# Patient Record
Sex: FEMALE | Race: WHITE
[De-identification: names, ages, dates, MRNs, and addresses within clinical notes are randomized per-mention and may not be internally consistent; named-entity substitution may affect disease eponyms.]

---

## 2017-08-03 ENCOUNTER — HOSPITAL ENCOUNTER (OUTPATIENT)
Dept: HOSPITAL 17 - PHSDC | Age: 82
Discharge: HOME | End: 2017-08-03
Attending: OPTOMETRIST
Payer: MEDICARE

## 2017-08-03 VITALS
TEMPERATURE: 97.8 F | OXYGEN SATURATION: 95 % | RESPIRATION RATE: 16 BRPM | SYSTOLIC BLOOD PRESSURE: 163 MMHG | DIASTOLIC BLOOD PRESSURE: 79 MMHG | HEART RATE: 90 BPM

## 2017-08-03 VITALS
HEART RATE: 68 BPM | RESPIRATION RATE: 20 BRPM | SYSTOLIC BLOOD PRESSURE: 190 MMHG | DIASTOLIC BLOOD PRESSURE: 84 MMHG | TEMPERATURE: 98.2 F | OXYGEN SATURATION: 98 %

## 2017-08-03 VITALS — HEART RATE: 66 BPM

## 2017-08-03 VITALS — HEIGHT: 61 IN | WEIGHT: 140.28 LBS | BODY MASS INDEX: 26.48 KG/M2

## 2017-08-03 VITALS — HEART RATE: 63 BPM

## 2017-08-03 DIAGNOSIS — E78.5: ICD-10-CM

## 2017-08-03 DIAGNOSIS — R49.0: ICD-10-CM

## 2017-08-03 DIAGNOSIS — L71.9: ICD-10-CM

## 2017-08-03 DIAGNOSIS — H34.8312: ICD-10-CM

## 2017-08-03 DIAGNOSIS — F43.23: ICD-10-CM

## 2017-08-03 DIAGNOSIS — Z96.1: ICD-10-CM

## 2017-08-03 DIAGNOSIS — Z79.82: ICD-10-CM

## 2017-08-03 DIAGNOSIS — E03.9: ICD-10-CM

## 2017-08-03 DIAGNOSIS — K21.9: ICD-10-CM

## 2017-08-03 DIAGNOSIS — Z79.899: ICD-10-CM

## 2017-08-03 DIAGNOSIS — N18.9: ICD-10-CM

## 2017-08-03 DIAGNOSIS — H04.123: ICD-10-CM

## 2017-08-03 DIAGNOSIS — G47.00: ICD-10-CM

## 2017-08-03 DIAGNOSIS — G51.0: ICD-10-CM

## 2017-08-03 DIAGNOSIS — I12.9: ICD-10-CM

## 2017-08-03 DIAGNOSIS — I70.0: ICD-10-CM

## 2017-08-03 DIAGNOSIS — H25.812: Primary | ICD-10-CM

## 2017-08-03 DIAGNOSIS — G25.0: ICD-10-CM

## 2017-08-03 DIAGNOSIS — M85.80: ICD-10-CM

## 2017-08-03 PROCEDURE — 00142 ANES PX ON EYE LENS SURGERY: CPT

## 2017-08-03 PROCEDURE — 66984 XCAPSL CTRC RMVL W/O ECP: CPT

## 2017-08-03 PROCEDURE — V2632 POST CHMBR INTRAOCULAR LENS: HCPCS

## 2017-08-03 RX ADMIN — FLURBIPROFEN SODIUM SCH DROP: 0.3 SOLUTION/ DROPS OPHTHALMIC at 06:55

## 2017-08-03 RX ADMIN — FLURBIPROFEN SODIUM SCH DROP: 0.3 SOLUTION/ DROPS OPHTHALMIC at 07:05

## 2017-08-03 RX ADMIN — CYCLOPENTOLATE HYDROCHLORIDE SCH DROP: 10 SOLUTION/ DROPS OPHTHALMIC at 06:55

## 2017-08-03 RX ADMIN — TROPICAMIDE SCH DROP: 10 SOLUTION/ DROPS OPHTHALMIC at 06:50

## 2017-08-03 RX ADMIN — TROPICAMIDE SCH DROP: 10 SOLUTION/ DROPS OPHTHALMIC at 07:05

## 2017-08-03 RX ADMIN — PHENYLEPHRINE HYDROCHLORIDE SCH DROP: 100 SOLUTION/ DROPS OPHTHALMIC at 06:55

## 2017-08-03 RX ADMIN — CYCLOPENTOLATE HYDROCHLORIDE SCH DROP: 10 SOLUTION/ DROPS OPHTHALMIC at 07:05

## 2017-08-03 RX ADMIN — CYCLOPENTOLATE HYDROCHLORIDE SCH DROP: 10 SOLUTION/ DROPS OPHTHALMIC at 06:50

## 2017-08-03 RX ADMIN — FLURBIPROFEN SODIUM SCH DROP: 0.3 SOLUTION/ DROPS OPHTHALMIC at 06:50

## 2017-08-03 RX ADMIN — PHENYLEPHRINE HYDROCHLORIDE SCH DROP: 100 SOLUTION/ DROPS OPHTHALMIC at 07:00

## 2017-08-03 RX ADMIN — TROPICAMIDE SCH DROP: 10 SOLUTION/ DROPS OPHTHALMIC at 07:00

## 2017-08-03 RX ADMIN — PHENYLEPHRINE HYDROCHLORIDE SCH DROP: 100 SOLUTION/ DROPS OPHTHALMIC at 07:05

## 2017-08-03 RX ADMIN — FLURBIPROFEN SODIUM SCH DROP: 0.3 SOLUTION/ DROPS OPHTHALMIC at 07:00

## 2017-08-03 RX ADMIN — CYCLOPENTOLATE HYDROCHLORIDE SCH DROP: 10 SOLUTION/ DROPS OPHTHALMIC at 07:00

## 2017-08-03 RX ADMIN — TROPICAMIDE SCH DROP: 10 SOLUTION/ DROPS OPHTHALMIC at 06:55

## 2017-08-03 RX ADMIN — PHENYLEPHRINE HYDROCHLORIDE SCH DROP: 100 SOLUTION/ DROPS OPHTHALMIC at 06:50

## 2017-08-03 NOTE — MP
cc:

KARRI MARINO M.D.

****

 

 

[facility] #: [id number]

 

DATE:  08/03/2017

 

PREOPERATIVE DIAGNOSIS:

Visually significant cataract left eye.

 

POSTOPERATIVE DIAGNOSIS:

Visually significant cataract left eye.

 

OPERATION:          Phacoemulsification with posterior chamber lens

                  implantation, left eye.

 

SURGEON:          Karri Marnio MD

 

ANESTHESIA:       Retrobulbar with MAC.

 

COMPLICATIONS: None.

 

PROCEDURE:        After informed consent was obtained, the patient was brought

into the operative suite and placed on appropriate monitors by the Anesthesia

Service.  The patient had received a prior retrobulbar injection of local

anesthetic by the Anesthesia Service in the holding area.  The patient's

operative eye was then prepped and draped in the usual sterile fashion.  A wire

lid speculum was placed.

 

A paracentesis incision was made in the peripheral cornea with a 1 mm taurus

keratome.  The anterior chamber was filled with viscoelastic.  The anterior

chamber was then entered through a stepped, clear corneal incision using a

sharp 3 mm taurus keratome.  A circular tear capsulorrhexis was then made with

a bent needle cystitome.  Following hydrodissection of the lens nucleus with

balanced saline, phacoemulsification of the nucleus was performed using a

modified chopping technique.  The remaining cortex was removed with

irrigation/aspiration.  The prior two procedures were both performed using the

handpieces of the [employer] phaco unit.

 

The capsular bag was then filled with viscoelastic.  The intraocular lens was

then injected into the capsular bag and positioned.  The type of intraocular

lens and its power can be found elsewhere in this chart.  The remaining

viscoelastic was then removed from the anterior chamber with the IA handpiece.

 

 

The anterior chamber was reformed with balanced saline.  The wound was then

closed securely with stromal hydration.  It was found to be watertight to an

intraocular pressure of at least 30 mmHg by palpation.   A small amount of

balanced salt solution was then removed through the paracentesis site and the

intraocular pressure at the end of the case was approximately 20 by palpation.

 

 

All drapes were then removed.  TobraDex ointment was then placed in the eye,

which was closed beneath a semi-pressure patch dressing.

 

The patient tolerated this procedure well and left the operating room awake and

alert.  The patient is to follow-up in my office in the morning.

 

 

 

                              _________________________________

                              MD KRISTI Lerner/SSB

D:  8/3/2017/10:29 AM

T:  8/3/2017/10:47 AM

Visit #:  [id number]

Job #:  [id number]

## 2017-12-16 ENCOUNTER — HOSPITAL ENCOUNTER (INPATIENT)
Dept: HOSPITAL 17 - NEPE | Age: 82
LOS: 3 days | Discharge: NURSING, REHAB OR LONG-TERM CARE | DRG: 87 | End: 2017-12-19
Attending: INTERNAL MEDICINE | Admitting: INTERNAL MEDICINE
Payer: MEDICARE

## 2017-12-16 VITALS
DIASTOLIC BLOOD PRESSURE: 82 MMHG | OXYGEN SATURATION: 99 % | SYSTOLIC BLOOD PRESSURE: 186 MMHG | RESPIRATION RATE: 18 BRPM | TEMPERATURE: 98.2 F | HEART RATE: 67 BPM

## 2017-12-16 VITALS
RESPIRATION RATE: 15 BRPM | DIASTOLIC BLOOD PRESSURE: 70 MMHG | TEMPERATURE: 98.2 F | SYSTOLIC BLOOD PRESSURE: 180 MMHG | OXYGEN SATURATION: 95 % | HEART RATE: 64 BPM

## 2017-12-16 VITALS
DIASTOLIC BLOOD PRESSURE: 59 MMHG | SYSTOLIC BLOOD PRESSURE: 125 MMHG | OXYGEN SATURATION: 98 % | RESPIRATION RATE: 16 BRPM | TEMPERATURE: 98.4 F | HEART RATE: 86 BPM

## 2017-12-16 VITALS
HEART RATE: 71 BPM | DIASTOLIC BLOOD PRESSURE: 70 MMHG | SYSTOLIC BLOOD PRESSURE: 153 MMHG | OXYGEN SATURATION: 96 % | RESPIRATION RATE: 18 BRPM

## 2017-12-16 VITALS
RESPIRATION RATE: 14 BRPM | OXYGEN SATURATION: 96 % | DIASTOLIC BLOOD PRESSURE: 71 MMHG | HEART RATE: 64 BPM | SYSTOLIC BLOOD PRESSURE: 163 MMHG

## 2017-12-16 VITALS
DIASTOLIC BLOOD PRESSURE: 77 MMHG | OXYGEN SATURATION: 100 % | SYSTOLIC BLOOD PRESSURE: 181 MMHG | TEMPERATURE: 97.5 F | HEART RATE: 63 BPM | RESPIRATION RATE: 20 BRPM

## 2017-12-16 VITALS — HEART RATE: 86 BPM

## 2017-12-16 VITALS — WEIGHT: 145.28 LBS | HEIGHT: 65 IN | BODY MASS INDEX: 24.21 KG/M2

## 2017-12-16 VITALS
OXYGEN SATURATION: 99 % | RESPIRATION RATE: 15 BRPM | DIASTOLIC BLOOD PRESSURE: 74 MMHG | HEART RATE: 65 BPM | SYSTOLIC BLOOD PRESSURE: 200 MMHG

## 2017-12-16 VITALS — HEART RATE: 85 BPM

## 2017-12-16 DIAGNOSIS — S02.19XA: ICD-10-CM

## 2017-12-16 DIAGNOSIS — F41.9: ICD-10-CM

## 2017-12-16 DIAGNOSIS — S06.5X0A: Primary | ICD-10-CM

## 2017-12-16 DIAGNOSIS — S02.40DA: ICD-10-CM

## 2017-12-16 DIAGNOSIS — F32.9: ICD-10-CM

## 2017-12-16 DIAGNOSIS — G25.0: ICD-10-CM

## 2017-12-16 DIAGNOSIS — M19.90: ICD-10-CM

## 2017-12-16 DIAGNOSIS — K21.9: ICD-10-CM

## 2017-12-16 DIAGNOSIS — E03.9: ICD-10-CM

## 2017-12-16 DIAGNOSIS — E78.5: ICD-10-CM
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
## 2017-12-16 DIAGNOSIS — I10: ICD-10-CM

## 2017-12-16 DIAGNOSIS — H91.90: ICD-10-CM

## 2017-12-16 LAB
ANION GAP SERPL CALC-SCNC: 6 MEQ/L (ref 5–15)
APTT BLD: 26.3 SEC (ref 24.3–30.1)
BASOPHILS # BLD AUTO: 0 TH/MM3 (ref 0–0.2)
BASOPHILS NFR BLD: 0.4 % (ref 0–2)
BUN SERPL-MCNC: 10 MG/DL (ref 7–18)
CHLORIDE SERPL-SCNC: 106 MEQ/L (ref 98–107)
EOSINOPHIL # BLD: 0.2 TH/MM3 (ref 0–0.4)
EOSINOPHIL NFR BLD: 3 % (ref 0–4)
ERYTHROCYTE [DISTWIDTH] IN BLOOD BY AUTOMATED COUNT: 13 % (ref 11.6–17.2)
GFR SERPLBLD BASED ON 1.73 SQ M-ARVRAT: 94 ML/MIN (ref 89–?)
HCO3 BLD-SCNC: 28.3 MEQ/L (ref 21–32)
HCT VFR BLD CALC: 40.7 % (ref 35–46)
HEMO FLAGS: (no result)
INR PPP: 1.1 RATIO
LYMPHOCYTES # BLD AUTO: 1.9 TH/MM3 (ref 1–4.8)
LYMPHOCYTES NFR BLD AUTO: 34.2 % (ref 9–44)
MCH RBC QN AUTO: 29.4 PG (ref 27–34)
MCHC RBC AUTO-ENTMCNC: 33.5 % (ref 32–36)
MCV RBC AUTO: 87.8 FL (ref 80–100)
MONOCYTES NFR BLD: 8 % (ref 0–8)
NEUTROPHILS # BLD AUTO: 3 TH/MM3 (ref 1.8–7.7)
NEUTROPHILS NFR BLD AUTO: 54.4 % (ref 16–70)
PLATELET # BLD: 179 TH/MM3 (ref 150–450)
POTASSIUM SERPL-SCNC: 4.1 MEQ/L (ref 3.5–5.1)
PROTHROMBIN TIME: 10.7 SEC (ref 9.8–11.6)
RBC # BLD AUTO: 4.63 MIL/MM3 (ref 4–5.3)
SODIUM SERPL-SCNC: 140 MEQ/L (ref 136–145)
WBC # BLD AUTO: 5.6 TH/MM3 (ref 4–11)

## 2017-12-16 PROCEDURE — 80048 BASIC METABOLIC PNL TOTAL CA: CPT

## 2017-12-16 PROCEDURE — 73100 X-RAY EXAM OF WRIST: CPT

## 2017-12-16 PROCEDURE — 86850 RBC ANTIBODY SCREEN: CPT

## 2017-12-16 PROCEDURE — 72125 CT NECK SPINE W/O DYE: CPT

## 2017-12-16 PROCEDURE — 70486 CT MAXILLOFACIAL W/O DYE: CPT

## 2017-12-16 PROCEDURE — 87641 MR-STAPH DNA AMP PROBE: CPT

## 2017-12-16 PROCEDURE — 85610 PROTHROMBIN TIME: CPT

## 2017-12-16 PROCEDURE — 85025 COMPLETE CBC W/AUTO DIFF WBC: CPT

## 2017-12-16 PROCEDURE — 73120 X-RAY EXAM OF HAND: CPT

## 2017-12-16 PROCEDURE — 70450 CT HEAD/BRAIN W/O DYE: CPT

## 2017-12-16 PROCEDURE — 86900 BLOOD TYPING SEROLOGIC ABO: CPT

## 2017-12-16 PROCEDURE — 86901 BLOOD TYPING SEROLOGIC RH(D): CPT

## 2017-12-16 PROCEDURE — 80053 COMPREHEN METABOLIC PANEL: CPT

## 2017-12-16 PROCEDURE — 85730 THROMBOPLASTIN TIME PARTIAL: CPT

## 2017-12-16 RX ADMIN — ACETAMINOPHEN PRN MG: 325 TABLET ORAL at 22:33

## 2017-12-16 RX ADMIN — METOPROLOL SUCCINATE SCH MG: 50 TABLET, FILM COATED, EXTENDED RELEASE ORAL at 21:09

## 2017-12-16 RX ADMIN — FAMOTIDINE SCH MG: 10 INJECTION, SOLUTION INTRAVENOUS at 21:09

## 2017-12-16 RX ADMIN — TEMAZEPAM PRN MG: 15 CAPSULE ORAL at 22:33

## 2017-12-16 RX ADMIN — PHENYTOIN SODIUM SCH MLS/HR: 50 INJECTION INTRAMUSCULAR; INTRAVENOUS at 14:00

## 2017-12-16 RX ADMIN — Medication SCH ML: at 21:09

## 2017-12-16 RX ADMIN — STANDARDIZED SENNA CONCENTRATE AND DOCUSATE SODIUM SCH TAB: 8.6; 5 TABLET, FILM COATED ORAL at 21:09

## 2017-12-16 NOTE — MB
cc:

MARIO JAVED

****

 

 

DATE OF CONSULTATION:  12/16/2017.

 

REASON FOR CONSULTATION / CHIEF COMPLAINT:

Fall.

 

HISTORY OF PRESENT ILLNESS:

This is an 83-year-old female patient with multiple medical problems.  She was

seen today at her primary care physician's office and sent to [place] for a CT

scan.  The patient reports that approximately two days ago she got up in the

middle of the night and upon return to her bed,  she slipped and fell hitting

her face on the bedside table.  She denies any loss of consciousness. Because

of a persistent mild headache, she was seen by her primary care physician today

and sent as stated above for a CT scan. Because of abnormal CT scan,

neurosurgery consultation has been placed.

 

Presently the patient complains of a mild headache, which is intermittent.  She

denies any other problems.

 

PAST MEDICAL HISTORY:

Her past medical history is remarkable for:

1. Atherosclerosis.

2. Tachycardia.

3. Depression.

4. Hard of hearing.

5. Gastroesophageal reflux disease (GERD).

6. Constipation.

7. Diarrhea.

8. Overactive bladder.

9. Insomnia.

10. Arthritis.

11. Benign essential tremor.

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

13. Anxiety.

14. Adjustment disorder.

15. Hypothyroidism.

 

ALLERGIES:

1. CODEINE.

2. MEPERIDINE.

3. METOPROLOL.

4. PENICILLIN G.

5. RISEDRONATE.

 

MEDICATIONS:

Her medications include:

1. Lovastatin.

2. Clonazepam.

3. Toprol XL.

4. Amlodipine.

5. Aspirin.

6. Calcium.

7. Levoxyl.

8. Lisinopril.

9. Omeprazole.

10. Temazepam.

11. Ascorbic acid.

12. Cod liver.

13. rosadan topical.

 

REVIEW OF SYSTEMS:

Her review of systems is unremarkable except for  those items mentioned above.

 

 

PHYSICAL EXAMINATION:

GENERAL PHYSICAL EXAM: Shows a well-developed anxious female in mild distress.

She is alert and awake.

VITAL SIGNS:  Her vital signs at the present time include a temperature of

98.2, pulse of 71, respirations 18, blood pressure 153/70,  pulse oximetry of

96%.

HEAD: Examination of the head shows some ecchymosis in the area of the left

orbit.

NECK: The neck is supple with no pain.

CHEST: Symmetrical.

LUNGS: Clear.

HEART: Regular rhythm with normal heart sounds.

ABDOMEN: Abdomen soft and nontender.

EXTREMITIES: Clear with some ecchymosis in the area of the hands bilaterally.

NEUROLOGIC: Neurologically the patient is alert and awake.  She follows

commands well.  She is oriented x3.  She is right-handed. Cranial nerves II

through XII are intact.  She has a history of recent cataract surgery on the

left. She is hard of hearing. Motor exam is 5+/5.  Sensory exam is intact to

touch. Deep tendon reflexes are 1+ in the upper extremities and 2+ at the knees

jerks and absent in the knee jerks.  She has silent Babinski bilaterally.

 

 

 

IMAGING STUDIES:

Review of a CT scan of the brain shows evidence of a falcine hemorrhage with a

little mass effect.

 

CT scan of the cervical spine shows degenerative changes.

 

IMPRESSION:

Traumatic falcine hemorrhage.

 

PLAN:

The plan is to admit the patient  to the intensive care unit for observation

overnight with neuro checks every two hours and head elevated with a repeat CT

in the morning.  This was discussed with the patient.

 

 

 

 

 

                              _________________________________

                              MD MARTY Mcclellan/SHANAE

D:  12/16/2017/1:04 PM

T:  12/16/2017/1:57 PM

Visit #:  [id number]

Job #:  [id number]

ROB

## 2017-12-16 NOTE — HHI.NSPN
fall





History


Interval History


84y/o female who sustained a fall at home 2 days ago. Denies LOC. Saw PCP today 

due to headache and sent to [place] for CT.


CT was abnormal and neurosurgery consult placed. Pt complains of mild headache 

which is intermittent. No neck pain





PMH: Depression, tachycardia, hard of hearing, overactive bladder, GERD, 

diarrhea, 


          Hypothyroidism,essential tremor, recent cataract surgery





Allergies: codeine, demerol, metoprolol, penicillin, risedronate





Meds: Lovastatin, clonazepam, Toprol XL, ASA, Temazepam, lisinopril, amlodipine


System Review Comments


see above





Exam


Results





Vital Signs








  Date Time  Temp Pulse Resp B/P (MAP) Pulse Ox O2 Delivery O2 Flow Rate FiO2


 


12/16/17 11:00  71 18 153/70 (97) 96 Room Air  


 


12/16/17 09:26 98.2       








Physical Examination


Alert and awake, Follows commands, right handed, oriented x 3, 


CN: Hard of hearing Pupils equal and reactive


Motor 5/5


Sensory intact to touch


DTRs UE 1+ KJ 2+ ankle neg


Silent babinski


Lab, Micro, Other Results


Labs reviewed





CT of head evidence of small falcine hemorrhage 





CT of C spine deg changes





Medical Decision Making


Impression and Plan


Imp: Falcine hemorrhage. No surgery needed





Rec: observation in ICU Repeat CT in AM


        Neuro checks every 2 hours


Total Minutes:  45











Lucio Mariee MD Dec 16, 2017 13:01

## 2017-12-16 NOTE — RADRPT
EXAM DATE/TIME:  12/16/2017 11:13 

 

[facility] COMPARISON:     

No previous studies available for comparison.

 

                     

INDICATIONS :     

Left hand pain, bruising post fall today

                     

 

MEDICAL HISTORY :     

None.          

 

SURGICAL HISTORY :     

None.   

 

ENCOUNTER:     

Initial                                        

 

ACUITY:     

1 day      

 

PAIN SCORE:     

5/10

 

LOCATION:     

Left  entire hand

 

FINDINGS:     

No acute fracture or dislocation is noted. Diffuse osteoporosis is noted. No significant joint space 
narrowing is noted.

 

CONCLUSION:     

1. No acute fracture or dislocation. 

2. Diffuse osteoporosis. 

3. No significant joint space narrowing noted.

 

 

 

 Perez Squires MD on December 16, 2017 at 12:08           

Board Certified Radiologist.

 This report was verified electronically.

## 2017-12-16 NOTE — RADRPT
EXAM DATE/TIME:  12/16/2017 10:23 

 

[facility] COMPARISON:     

No previous studies available for comparison.

 

 

INDICATIONS :     

Trauma, fall two days ago onto table.

                      

 

RADIATION DOSE:     

22.22 CTDIvol (mGy) 

 

 

 

MEDICAL HISTORY :     

Hypertension.  

 

SURGICAL HISTORY :      

Hysterectomy. 

 

ENCOUNTER:      

Initial

 

ACUITY:      

1 day

 

PAIN SCALE:      

5/10

 

LOCATION:       

Bilateral  neck

 

TECHNIQUE:     

Volumetric scanning of the cervical spine was performed. Multiplanar reconstructions in the sagittal,
 coronal and oblique axial planes were performed.   Using automated exposure control and adjustment o
f the mA and/or kV according to patient size, radiation dose was kept as low as reasonably achievable
 to obtain optimal diagnostic quality images.   DICOM format image data is available electronically f
or review and comparison.  

 

FINDINGS:     

There is no acute fracture or prevertebral soft tissue swelling. Cervical spondylosis is noted at C5-
6, C6-7, and to lesser extent at C3-4 and C4-5. Moderate spinal stenosis is noted at C5-6 and mild sp
inal stenosis is noted at C4-5 and C6-7. Moderate bilateral foraminal narrowing is noted at C3-4, C4-
5, C5-6 and C6-7 and mild bilateral foraminal narrowing is noted C7-T1. The bony relationship and ali
gnment between C1 and C2 is well maintained.

 

 

CONCLUSION:   

1. No acute fracture or prevertebral soft tissue swelling. 

2. Moderate spinal stenosis at C5-6 and mild spinal stenosis at C4-5 and C6-7. 

3. Moderate bilateral foraminal narrowing at C3-4, C4-5, C5-6 and C6-7 and mild bilateral foraminal n
arrowing at C7-T1.     

 

 

 

 Perez Squires MD on December 16, 2017 at 11:05           

Board Certified Radiologist.

 This report was verified electronically.

## 2017-12-16 NOTE — RADRPT
EXAM DATE/TIME:  12/16/2017 11:16 

 

[facility] COMPARISON:     

HAND RIGHT LIMITED (2VWS), December 16, 2017, 11:17.

 

                     

INDICATIONS :     

Right wrist pain post fall today

                     

 

MEDICAL HISTORY :     

None.          

 

SURGICAL HISTORY :     

None.   

 

ENCOUNTER:     

Initial                                        

 

ACUITY:     

1 day      

 

PAIN SCORE:     

5/10

 

LOCATION:     

Right  entire wrist

 

FINDINGS:     

Osteoporosis is noted. There is no acute fracture or dislocation of the right wrist. Possible old unu
nited ulnar styloid process fracture is noted.

 

CONCLUSION:     

1. No acute fracture or dislocation. 

2. Possible old ununited ulnar styloid fracture. 

3. Diffuse osteoporosis. 

 

 

 Perez Squires MD on December 16, 2017 at 12:10           

Board Certified Radiologist.

 This report was verified electronically.

## 2017-12-16 NOTE — PD.CONS
History of Present Illness


Service


Maxillofacial


Consult Requested By


Primary team


Reason for Consult


L maxillary fracture


Primary Care Physician


Min Holbrook D.O.


Diagnoses:  


History of Present Illness


83F w/ multiple medical problems, referred here for CT by PCP after GLF 2 days 

ago.  Pt fell on the way to the bathroom in the night. She hit her L cheek on a 

table corner. No LOC. No direct eye injury. No vision changes, blurriness, 

double vision.  Facial pain minimal.  Complains of mild headache. No bite 

changes/abnormalities/sensory changes.





Review of Systems


Non contributory to presenting complaint





Past Family Social History


Allergies:  


Coded Allergies:  


     codeine (Unverified  Allergy, Severe, Nausea/Vomiting, 12/16/17)


     meperidine (Unverified  Allergy, Severe, Nausea/Vomiting, 12/16/17)


     metoprolol (Unverified  Allergy, Severe, Arrhythmias, 12/16/17)


     penicillin G (Unverified  Allergy, Severe, pt denies allergy, 12/16/17)


     risedronate sodium (Unverified  Allergy, Mild, 12/16/17)


 PT IS UNCERTAIN IF SHE IS ALLERGIC TO THIS MEDICATION


     *MDRO Multi-Drug Resistant Organism (Unverified  Allergy, Unknown, 12/16/17

)


 MRSA 2014


Past Medical History


From chart 


1. Atherosclerosis.


2. Tachycardia.


3. Depression.


4. Hard of hearing.


5. Gastroesophageal reflux disease (GERD).


6. Constipation.


7. Diarrhea.


8. Overactive bladder.


9. Insomnia.


10. Arthritis.


11. Benign essential tremor.


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


13. Anxiety.


14. Adjustment disorder.


15. Hypothyroidism.


Past Surgical History


Hyerectomy, tonsils, appy, 'foot surgery'


Reported Medications


Med list reviewed


From chart


1. Lovastatin.


2. Clonazepam.


3. Toprol XL.


4. Amlodipine.


5. Aspirin.


6. Calcium.


7. Levoxyl.


8. Lisinopril.


9. Omeprazole.


10. Temazepam.


11. Ascorbic acid.


12. Cod liver.


Family History


Non contributory to presenting complaint


Social History


No tobacco





Physical Exam


Vital Signs





Vital Signs








  Date Time  Temp Pulse Resp B/P (MAP) Pulse Ox O2 Delivery O2 Flow Rate FiO2


 


12/16/17 15:06        


 


12/16/17 13:00  64 14 163/71 (101) 96 Room Air  


 


12/16/17 11:00  71 18 153/70 (97) 96 Room Air  


 


12/16/17 09:54  65 15 200/74 (116) 99 Room Air  


 


12/16/17 09:26 98.2 67 18 186/82 (116) 99   








Physical Exam


GENERAL: This is a well-nourished, well-developed patient, in no apparent 

distress.


SKIN: No rashes, ecchymoses or lesions. Cool and dry. L malar eminence w/ 

superficial 1cm abrasion no involving the eyelid proper


HEAD: Normocephalic. No temporal or scalp tenderness.


EYES: Pupils equal round and reactive. Extraocular motions intact. No scleral 

icterus. No injection or drainage. 


ENT: Nose without bleeding, purulent drainage or septal hematoma. Throat 

without erythema, tonsillar hypertrophy or exudate. Uvula midline. Airway 

patent.


NECK: Trachea midline. No JVD or lymphadenopathy. Supple, nontender, no 

meningeal signs.


RESPIRATORY: Non labored breathing


MUSCULOSKELETAL: Extremities without clubbing, cyanosis, or edema. No joint 

tenderness, effusion, or edema noted. No calf tenderness. 


NEUROLOGICAL: Awake and alert. Cranial nerves II through XII intact.  


VI-III sensation intact


No focal deficits


Non ttp over all facial bones including mandible, palate, and teeth except for 

very minimal ttp directly over L malar eminence


No enopthalmos


Laboratory





Laboratory Tests








Test


  12/16/17


12:00


 


White Blood Count 5.6 


 


Red Blood Count 4.63 


 


Hemoglobin 13.6 


 


Hematocrit 40.7 


 


Mean Corpuscular Volume 87.8 


 


Mean Corpuscular Hemoglobin 29.4 


 


Mean Corpuscular Hemoglobin


Concent 33.5 


 


 


Red Cell Distribution Width 13.0 


 


Platelet Count 179 


 


Mean Platelet Volume 9.1 


 


Neutrophils (%) (Auto) 54.4 


 


Lymphocytes (%) (Auto) 34.2 


 


Monocytes (%) (Auto) 8.0 


 


Eosinophils (%) (Auto) 3.0 


 


Basophils (%) (Auto) 0.4 


 


Neutrophils # (Auto) 3.0 


 


Lymphocytes # (Auto) 1.9 


 


Monocytes # (Auto) 0.4 


 


Eosinophils # (Auto) 0.2 


 


Basophils # (Auto) 0.0 


 


CBC Comment DIFF FINAL 


 


Differential Comment  


 


Prothrombin Time 10.7 


 


Prothromb Time International


Ratio 1.1 


 


 


Activated Partial


Thromboplast Time 26.3 


 


 


Blood Urea Nitrogen 10 


 


Creatinine 0.61 


 


Random Glucose 90 


 


Calcium Level 8.9 


 


Sodium Level 140 


 


Potassium Level 4.1 


 


Chloride Level 106 


 


Carbon Dioxide Level 28.3 


 


Anion Gap 6 


 


Estimat Glomerular Filtration


Rate 94 


 








Result Diagram:  


12/16/17 1200                                                                  

              12/16/17 1200





Imaging


Maxface CT personally reviewed. ? L orbit fx, but L maxillary sinus ant/med/inf 

wall fx appreciated. No fx of palate, pterygoid processes, orbital floor, or 

zygomatic arch. non displaced fracture fragments


Following this, I then separately reviewed the CT w/ a second radiologist who 

was in the reading room at the time, who also concurred w/ original read as 

well as my own.





Assessment and Plan


Assessment and Plan


L maxillary sinus fx stable


No effects on mastication or eye fxn


No apparent deformity


No prolapse of orbital contents, therefore enopthalmos should not develop and 

is absent currently


No restrictions except fall prevention


Please call w/ further concerns











Darren Zhao MD Dec 16, 2017 15:32

## 2017-12-16 NOTE — RADRPT
EXAM DATE/TIME:  12/16/2017 10:23 

 

[facility] COMPARISON:     

No previous studies available for comparison.

 

 

INDICATIONS :     

Trauma, fall two days ago onto table.

                      

 

RADIATION DOSE:     

26.35 CTDIvol (mGy) 

 

 

MEDICAL HISTORY :     

Hypertension.  

 

SURGICAL HISTORY :      

Hysterectomy. 

 

ENCOUNTER:      

Initial

 

ACUITY:      

2 days

 

PAIN SCORE:      

6/10

 

LOCATION:       

Left  cheek

 

TECHNIQUE:     

Volumetric scanning of the facial bones was performed.  Using automated exposure control and adjustme
nt of the mA and/or kV according to patient size, radiation dose was kept as low as reasonably achiev
able to obtain optimal diagnostic quality images.  DICOM format image data is available electronicall
y for review and comparison.  

 

FINDINGS:     

 

ORBITS:     

There is subtle acute fracture involving the lateral wall the left orbit. The retroconal structures h
ave a normal configuration.  No radiopaque foreign bodies are seen.

 

NASAL BONE:     

The nasal bone and maxillary spine are intact

 

ZYGOMATIC ARCHES:     

Symmetric without evidence of fracture.

 

SINUSES:     

There are acute fractures involving the anterior, medial and inferior walls of the left maxillary sin
us. Minimal mucosal thickening is noted involving left maxillary sinus. The ethmoid and frontal sinus
es are intact.  No air-fluid levels seen.

 

NASAL CAVITY:     

The nasal septum is intact and midline.  The lacrimal ducts are intact.

 

SOFT TISSUES:     

No radiopaque foreign bodies seen.  No soft-tissue swelling is seen.

 

INTRACRANIAL:     

No intracranial air seen.

 

CRIBIFORM PLATE:     

Grossly intact.

 

CONCLUSION:     

1. Acute fractures involving the lateral wall of left orbit, as well as the anterior, medial and infe
rior walls of the left maxillary sinus. 

2. Minimal mucosal thickening is noted involving the maxillary sinus 

 

 

 Perez Squires MD on December 16, 2017 at 10:58           

Board Certified Radiologist.

 This report was verified electronically.

## 2017-12-16 NOTE — HHI.HP
HPI


Service


Critical Care Medicine


Primary Care Physician


Min Holbrook D.O.


Admission Diagnosis





Subdural hematoma


Diagnosis:  


(1) Acute subdural hemorrhage


Diagnosis:  Principal





(2) Facial fracture due to fall


Chief Complaint:  


S/p fall with acute SDH


Travel History


International Travel<30 Days:  No


Contact w/Intl Traveler <30 Da:  No


Traveled to Known Affected Are:  No


History of Present Illness


83 year old  female with past medical history significant for 

hypertension, dyslipidemia, hypothyroidism, anxiety and depression.  She was 

sent from her primary care doctor's office for a CT of the head due to a fall 

sustained 2 days ago.  She slipped and fell and hit her face on a bedside 

table.  Post fall she had a headache but hasn't resolved now.  Stat CT of the 

head showed two left parafalcine subdural hemorrhages within the left parietal 

region measuring 2.3 x 0.6 cm and 0.9 x 0.4 cm. CT of the facial bones showed 

acute fractures involving the lateral wall of left orbit, anterior, medial and 

inferior walls of the left maxillary sinus. I evaluated the patient in the ICU. 

Denies headache or any focal weakness. Had no seizures post fall. Patient is 

hypertensive. When necessary hydralazine ordered and home antihypertensives had 

been started. Neurosurgeon has seen the patient and as well as oral and 

maxillofacial surgeon. Follow-up CT of the head planned for a.m.





Review of Systems


ROS Limitations:  Clinical Condition





Past Family Social History


Allergies:  


Coded Allergies:  


     codeine (Unverified  Allergy, Severe, Nausea/Vomiting, 17)


     meperidine (Unverified  Allergy, Severe, Nausea/Vomiting, 17)


     metoprolol (Unverified  Allergy, Severe, Arrhythmias, 17)


     penicillin G (Unverified  Allergy, Severe, pt denies allergy, 17)


     risedronate sodium (Unverified  Allergy, Mild, 17)


 PT IS UNCERTAIN IF SHE IS ALLERGIC TO THIS MEDICATION


     *MDRO Multi-Drug Resistant Organism (Unverified  Allergy, Unknown, 17

)


 MRSA 


Past Medical History


Hypothyroidism


Depression


GERD


Overactive bladder


Insomnia


DJD


Anxiety.


Dyslipidemia


Past Surgical History


Appendectomy


Cataract surgery


Hysterectomy


Tonsillectomy


Left breast biopsy


Tonsillectomy


Reported Medications


Rosadan Topical 0.75% (Metronidazole Topical 0.75%) 0.75% Gel 1 Applic TOPICAL 

BID


Cod Liver Oil 5,000-500 Unit/5Ml Oil 5 Ml PO DAILY


Lovastatin 10 Mg Tab 10 Mg PO DAILY


Clonazepam 1 Mg Tab 1 Mg PO BID


Toprol XL (Metoprolol Succinate) 100 Mg Tab 100 Mg PO HS


Amlodipine (Amlodipine Besylate) 5 Mg Tab 5 Mg PO DAILY


Aspirin EC (Aspirin) 81 Mg Tabdr 81 Mg PO DAILY


Calcium 500 +D (Calcium Carbonate-Cholecalciferol) 500-400 Mg-Unit Tab 1 Tab PO 

DAILY


Levoxyl (Levothyroxine Sodium) 50 Mcg Tab 50 Mcg PO DAILY


Lisinopril 20 Mg Tab 20 Mg PO DAILY


Omeprazole 20 Mg Tab 20 Mg PO DAILY


Temazepam 15 Mg Cap 15 Mg PO HS PRN


Ascorbic Acid 500 Mg Tab 500 Mg PO DAILY


Active Ordered Medications


Reviewed


Family History


Not contributory


Social History


No alcohol or tobacco use





Physical Exam


Vital Signs





Vital Signs








  Date Time  Temp Pulse Resp B/P (MAP) Pulse Ox O2 Delivery O2 Flow Rate FiO2


 


17 13:00  64 14 163/71 (101) 96 Room Air  


 


17 11:00  71 18 153/70 (97) 96 Room Air  


 


17 09:54  65 15 200/74 (116) 99 Room Air  


 


17 09:26 98.2 67 18 186/82 (116) 99   








Physical Exam


GENERAL: Elderly  female not in any acute distress


SKIN: Warm and dry


HEAD: Ecchymoses in left maxillary region.  


EYES: Pupils equal and round. No scleral icterus. 


ENT: No nasal bleeding or discharge. Airway patent


NECK: Trachea midline. No JVD. 


CARDIOVASCULAR: Regular rate and rhythm.  No murmur appreciated.


RESPIRATORY: No accessory muscle use. Clear to auscultation. 


GASTROINTESTINAL: Abdomen soft, non-tender, nondistended. 


MUSCULOSKELETAL: Swelling of right thumb and wrist. Sensation intact. Left hand

, mild erythema pulses +


NEUROLOGICAL: Awake and alert. No obvious cranial nerve deficits.  Motor 

grossly within normal limits.


Laboratory





Laboratory Tests








Test


  17


12:00


 


White Blood Count 5.6 


 


Red Blood Count 4.63 


 


Hemoglobin 13.6 


 


Hematocrit 40.7 


 


Mean Corpuscular Volume 87.8 


 


Mean Corpuscular Hemoglobin 29.4 


 


Mean Corpuscular Hemoglobin


Concent 33.5 


 


 


Red Cell Distribution Width 13.0 


 


Platelet Count 179 


 


Mean Platelet Volume 9.1 


 


Neutrophils (%) (Auto) 54.4 


 


Lymphocytes (%) (Auto) 34.2 


 


Monocytes (%) (Auto) 8.0 


 


Eosinophils (%) (Auto) 3.0 


 


Basophils (%) (Auto) 0.4 


 


Neutrophils # (Auto) 3.0 


 


Lymphocytes # (Auto) 1.9 


 


Monocytes # (Auto) 0.4 


 


Eosinophils # (Auto) 0.2 


 


Basophils # (Auto) 0.0 


 


CBC Comment DIFF FINAL 


 


Differential Comment  


 


Prothrombin Time 10.7 


 


Prothromb Time International


Ratio 1.1 


 


 


Activated Partial


Thromboplast Time 26.3 


 


 


Blood Urea Nitrogen 10 


 


Creatinine 0.61 


 


Random Glucose 90 


 


Calcium Level 8.9 


 


Sodium Level 140 


 


Potassium Level 4.1 


 


Chloride Level 106 


 


Carbon Dioxide Level 28.3 


 


Anion Gap 6 


 


Estimat Glomerular Filtration


Rate 94 


 








Result Diagram:  


17 1200                                                                  

              17 1200





Imaging


CT of the head showed two left parafalcine subdural hemorrhages left parietal 

region measuring 2.3 x 0.6 cm and 0.9 x 0.4 cm.


CT of the facial bones showed acute fractures of the lateral wall of left orbit

, anterior, medial and inferior walls of the left maxillary sinus.





Septic Shock Reassessment


Septic shock perfusion:  reassessment completed





Caprini VTE Risk Assessment


Caprini VTE Risk Assessment:  Mod/High Risk (score >= 2)


VTE Pharm Contraindication:  Hemorrhage


Caprini Risk Assessment Model











 Point Value = 1          Point Value = 2  Point Value = 3  Point Value = 5


 


Age 41-60


Minor surgery


BMI > 25 kg/m2


Swollen legs


Varicose veins


Pregnancy or postpartum


History of unexplained or recurrent


   spontaneous 


Oral contraceptives or hormone


   replacement


Sepsis (< 1 month)


Serious lung disease, including


   pneumonia (< 1 month)


Abnormal pulmonary function


Acute myocardial infarction


Congestive heart failure (< 1 month)


History of inflammatory bowel disease


Medical patient at bed rest Age 61-74


Arthroscopic surgery


Major open surgery (> 45 min)


Laparoscopic surgery (> 45 min)


Malignancy


Confined to bed (> 72 hours)


Immobilizing plaster cast


Central venous access Age >= 75


History of VTE


Family history of VTE


Factor V Leiden


Prothrombin 46064O


Lupus anticoagulant


Anticardiolipin antibodies


Elevated serum homocysteine


Heparin-induced thrombocytopenia


Other congenital or acquired


   thrombophilia Stroke (< 1 month)


Elective arthroplasty


Hip, pelvis, or leg fracture


Acute spinal cord injury (< 1 month)








Prophylaxis Regimen











   Total Risk


Factor Score Risk Level Prophylaxis Regimen


 


0-1      Low Early ambulation


 


2 Moderate Order ONE of the following:


*Sequential Compression Device (SCD)


*Heparin 5000 units SQ BID


 


3-4 Higher Order ONE of the following medications:


*Heparin 5000 units SQ TID


*Enoxaparin/Lovenox 40 mg SQ daily (WT < 150 kg, CrCl > 30 mL/min)


*Enoxaparin/Lovenox 30 mg SQ daily (WT < 150 kg, CrCl > 10-29 mL/min)


*Enoxaparin/Lovenox 30 mg SQ BID (WT < 150 kg, CrCl > 30 mL/min)


AND/OR


*Sequential Compression Device (SCD)


 


5 or more Highest Order ONE of the following medications:


*Heparin 5000 units SQ TID (Preferred with Epidurals)


*Enoxaparin/Lovenox 40 mg SQ daily (WT < 150 kg, CrCl > 30 mL/min)


*Enoxaparin/Lovenox 30 mg SQ daily (WT < 150 kg, CrCl > 10-29 mL/min)


*Enoxaparin/Lovenox 30 mg SQ BID (WT < 150 kg, CrCl > 30 mL/min)


AND


*Sequential Compression Device (SCD)











Assessment and Plan


Assessment and Plan


NEURO: 


Status post fall


Acute falls and subdural hemorrhage


Left orbital and maxillary sinus fracture


- Neurosurgery has already seen patient no surgical intervention needed at this 

time


- CT of the head in a.m.


- Keep sodium normal


- OMFS consult appreciated, no surgical indication 





RESP: 


- Nasal cannula oxygen


- Aggressive pulmonary toilet





CV: 


Uncontrolled hypertension 


History of hypertension 


Dyslipidemia 


- Normal saline IV fluids 100 ml per hour


- Hydralazine 20 mg IV every 6 hours when necessary for SBP more than 160. 

Target systolic blood pressure less than 160


- Continue home medications including lisinopril, metoprolol, amlodipine, 

continue statin





GI: 


- Heart healthy diet


- IV famotidine





: 


- Monitor renal function closely.  Continue IV hydration





ID: 


- No antibiotics at this time





HEME: 


- Monitor CBC, CMP, coags





ENDO:


- Electrolyte replacement per protocol





PROPH: 


- Bilateral lower extremity SCDs, BRAYAN. Avoid chemical DVT prophylaxis





LINES: 


- Utilize peripheral IVs, central line if needed





CC time 32 min





Condition is critically ill but stable. She has sustained a fall with falls and 

subdural hemorrhage, expansion of which can cause life-threatening 

complications including death.  At this time will treat conservatively with 

blood pressure control.  Repeat CT of the head in a.m.


Code Status


Full


Discussed Condition With


Dr. Ruiz





Problem Qualifiers





(1) Facial fracture due to fall:  








Jose Ybarra MD Dec 16, 2017 15:06

## 2017-12-16 NOTE — RADRPT
EXAM DATE/TIME:  12/16/2017 11:17 

 

[facility] COMPARISON:     

No previous studies available for comparison.

 

                     

INDICATIONS :     

Right hand pain, bruising post fall

                     

 

MEDICAL HISTORY :     

None.          

 

SURGICAL HISTORY :     

None.   

 

ENCOUNTER:     

Initial                                        

 

ACUITY:     

1 day      

 

PAIN SCORE:     

5/10

 

LOCATION:     

Right  entire hand

 

FINDINGS:     

Diffuse osteoporosis is noted. There is no acute fracture or dislocation. Mild osteoarthritis is note
d involving the first carpometacarpal joint, scaphotrapezium and scaphotrapezoid joints and the inter
phalangeal joints of the right hand. Probable old ununited ulnar styloid process fracture is noted.

 

CONCLUSION:     

1. No acute fracture or dislocation. 

2. Mild osteoarthritis as described above. 

3. Probable old ununited ulnar styloid process fracture. 

4. Diffuse osteoporosis. 

 

 

 Perez Squires MD on December 16, 2017 at 12:12           

Board Certified Radiologist.

 This report was verified electronically.

## 2017-12-16 NOTE — RADRPT
EXAM DATE/TIME:  12/16/2017 10:19 

 

[facility] COMPARISON:     

No previous studies available for comparison.

 

 

INDICATIONS :     

Trauma, fall two days ago onto table.

                      

 

RADIATION DOSE:     

56.35 CTDIvol (mGy) 

 

 

 

MEDICAL HISTORY :     

Hypertension.  

 

SURGICAL HISTORY :      

Hysterectomy. 

 

ENCOUNTER:      

Initial

 

ACUITY:      

2 days

 

PAIN SCALE:      

5/10

 

LOCATION:       

Bilateral  head

 

TECHNIQUE:     

Multiple contiguous axial images were obtained of the head.  Using automated exposure control and adj
ustment of the mA and/or kV according to patient size, radiation dose was kept as low as reasonably a
chievable to obtain optimal diagnostic quality images.   DICOM format image data is available electro
nically for review and comparison.  

 

FINDINGS:     

 

CEREBRUM:     

The ventricles are normal for age. There is evidence of two left parafalcine high density collections
 within the left parietal region measuring 2.3 x 0.6 cm and 0.9 x 0.4 cm suggestive of probable acute
 subdural hematomas. No midline shift is noted.

 

POSTERIOR FOSSA:     

The cerebellum and brainstem are intact.  The 4th ventricle is midline.  The cerebellopontine angle i
s unremarkable.

 

EXTRACRANIAL:     

The visualized portion of the orbits is intact.

 

SKULL:     

The calvaria is intact.  No evidence of skull fracture.

 

CONCLUSION:     Two left parafalcine high density collections within the left parietal region measuri
ng 2.3 x 0.6 cm and 0.9 x 0.4 cm suggestive of probable acute subdural hematomas.     

 

 

 Perez Squires MD on December 16, 2017 at 10:54           

Board Certified Radiologist.

 This report was verified electronically.

## 2017-12-16 NOTE — PD
HPI


Chief Complaint:  Fall


Time Seen by Provider:  09:46


Travel History


International Travel<30 days:  No


Contact w/Intl Traveler<30days:  No


Traveled to known affect area:  No





History of Present Illness


HPI


82yo F with PMH of HTN, HLD, hypothyroidism was sent here from her PMD's office 

for CT scan.  Pt said she got up to go to the bathroom in the middle of the 

night 2 days ago and was half asleep and when she tried to climb back into bed 

she slipped and hit her face on the bedside table.  Denies any LOC, dizziness, 

chest pain, sob, n/v, abdominal pain, focal weakness or numbness.  Pt had 

headache but no longer has any headache right now.  As per paperwork from the 

clinic visit today, "patient has elected to go to ER as we cannot complete 

outpatient CT at this time."  Pt's friend is with her and said she is acting 

like her normal self.





PFSH


Past Medical History


Autoimmune Disease:  No


Blood Disorders:  No


Anxiety:  Yes


Depression:  Yes (MILD)


Heart Rhythm Problems:  Yes (TACHYCARDIA)


Cancer:  No


Cardiovascular Problems:  Yes (ATHEROSCLEROSIS OF AORTA)


High Cholesterol:  No


Chemotherapy:  No


Chest Pain:  No


Congestive Heart Failure:  No


Diabetes:  No


Diminished Hearing:  Yes ([place])


Endocrine:  Yes


Gastrointestinal Disorders:  Yes (GERD, CONSTIPATION/ DIARRHEA)


GERD:  Yes


Genitourinary:  Yes (OVERACTIVE BLADDER)


Hepatitis:  No


Hiatal Hernia:  No


Hypertension:  Yes


Immune Disorder:  No


Medical other:  Yes (INSOMNIA)


Musculoskeletal:  Yes (ARTHRITIS)


Neurologic:  Yes (BENIGN ESSENTIAL TREMOR, SPASMODIC DYSPHONIA, BELLS PALSY)


Psychiatric:  Yes (ANXIETY, ADJUSTMENT DISORDER WITH DEPRESSED MOOD)


Reproductive:  No


Respiratory:  No


Immunizations Current:  Yes


Myocardial Infarction:  No


Radiation Therapy:  No


Thyroid Disease:  Yes (HYPOTHYROIDISM)


Menopausal:  Yes





Past Surgical History


Abdominal Surgery:  Yes (APPENDECTOMY)


AICD:  No


Appendectomy:  Yes


Cardiac Surgery:  No


Ear Surgery:  No


Endocrine Surgery:  No


Eye Surgery:  Yes (RIGHT PHACO CATARACT)


Genitourinary Surgery:  No


Gynecologic Surgery:  Yes (HYSTERECTOMY)


Hysterectomy:  Yes


Joint Replacement:  No


Neurologic Surgery:  No


Oral Surgery:  Yes (TONSILLECTOMY)


Pacemaker:  No


Thoracic Surgery:  Yes (LEFT BREAST BIOPSY)


Tonsillectomy:  Yes


Other Surgery:  Yes





Social History


Alcohol Use:  No


Tobacco Use:  No


Substance Use:  No





Allergies-Medications


(Allergen,Severity, Reaction):  


Coded Allergies:  


     codeine (Unverified  Allergy, Severe, Nausea/Vomiting, 12/16/17)


     meperidine (Unverified  Allergy, Severe, Nausea/Vomiting, 12/16/17)


     metoprolol (Unverified  Allergy, Severe, Arrhythmias, 12/16/17)


     penicillin G (Unverified  Allergy, Severe, pt denies allergy, 12/16/17)


     risedronate sodium (Unverified  Allergy, Mild, 12/16/17)


 PT IS UNCERTAIN IF SHE IS ALLERGIC TO THIS MEDICATION


Reported Meds & Prescriptions





Reported Meds & Active Scripts


Active


Reported


Rosadan Topical 0.75% (Metronidazole Topical 0.75%) 0.75% Gel 1 Applic TOPICAL 

BID


Cod Liver Oil 5,000-500 Unit/5Ml Oil 5 Ml PO DAILY


Lovastatin 10 Mg Tab 10 Mg PO DAILY


Clonazepam 1 Mg Tab 1 Mg PO BID


Toprol XL (Metoprolol Succinate) 100 Mg Tab 100 Mg PO HS


Amlodipine (Amlodipine Besylate) 5 Mg Tab 5 Mg PO DAILY


Aspirin EC (Aspirin) 81 Mg Tabdr 81 Mg PO DAILY


Calcium 500 +D (Calcium Carbonate-Cholecalciferol) 500-400 Mg-Unit Tab 1 Tab PO 

DAILY


Levoxyl (Levothyroxine Sodium) 50 Mcg Tab 50 Mcg PO DAILY


Lisinopril 20 Mg Tab 20 Mg PO DAILY


Omeprazole 20 Mg Tab 20 Mg PO DAILY


Temazepam 15 Mg Cap 15 Mg PO HS PRN


Ascorbic Acid 500 Mg Tab 500 Mg PO DAILY








Review of Systems


Except as stated in HPI:  all other systems reviewed are Neg





Physical Exam


Narrative


GENERAL: 82yo F not in distress.


SKIN: Focused skin assessment warm/dry.


HEAD: Ecchymoses in left maxilla.  Some ttp occiput but no swelling or bleeding.


EYES: Pupils equal and round. No scleral icterus. No injection or drainage. 


ENT: No nasal bleeding or discharge.  Mucous membranes pink and moist.


NECK: Trachea midline. No JVD. 


CARDIOVASCULAR: Regular rate and rhythm.  No murmur appreciated.


RESPIRATORY: No accessory muscle use. Clear to auscultation. Breath sounds 

equal bilaterally. 


GASTROINTESTINAL: Abdomen soft, non-tender, nondistended. 


MUSCULOSKELETAL: RUE: +Ecchymose and swelling in thumb and wrist with some ttp.

  Radial pulse 2+.  Sensation intact.


Left hand: Mild erythema dorsum of left hand.  No ttp.  Radial pulse 2+.  

Sensation intact.


NEUROLOGICAL: Awake and alert. No obvious cranial nerve deficits.  Motor 

grossly within normal limits. Stuttering but friend said this is her normal 

speech.





Data


Data


Last Documented VS





Vital Signs








  Date Time  Temp Pulse Resp B/P (MAP) Pulse Ox O2 Delivery O2 Flow Rate FiO2


 


12/16/17 13:00  64 14 163/71 (101) 96 Room Air  


 


12/16/17 09:26 98.2       








Orders





 Orders


Ct Brain W/O Iv Contrast(Rout) (12/16/17 )


Ct Cerv Spine W/O Contrast (12/16/17 )


Ct Facial Bones W/O Iv Cont (12/16/17 )


Hand, Limited (2vws) (12/16/17 )


Hand, Limited (2vws) (12/16/17 )


Wrist, Limited (Ap&Lat) (12/16/17 )


Acetaminophen (Tylenol) (12/16/17 11:15)


Complete Blood Count With Diff (12/16/17 11:45)


Basic Metabolic Panel (Bmp) (12/16/17 11:45)


Prothrombin Time / Inr (Pt) (12/16/17 11:45)


Act Partial Throm Time (Ptt) (12/16/17 11:45)


Type And Screen (12/16/17 11:45)


Consult Neurosurgery (12/16/17 )


(Hub Use Only)Inp Phy Cons/Ref (12/16/17 )


Admit Order (Ed Use Only) (12/16/17 13:23)





Labs





Laboratory Tests








Test


  12/16/17


12:00


 


White Blood Count 5.6 TH/MM3 


 


Red Blood Count 4.63 MIL/MM3 


 


Hemoglobin 13.6 GM/DL 


 


Hematocrit 40.7 % 


 


Mean Corpuscular Volume 87.8 FL 


 


Mean Corpuscular Hemoglobin 29.4 PG 


 


Mean Corpuscular Hemoglobin


Concent 33.5 % 


 


 


Red Cell Distribution Width 13.0 % 


 


Platelet Count 179 TH/MM3 


 


Mean Platelet Volume 9.1 FL 


 


Neutrophils (%) (Auto) 54.4 % 


 


Lymphocytes (%) (Auto) 34.2 % 


 


Monocytes (%) (Auto) 8.0 % 


 


Eosinophils (%) (Auto) 3.0 % 


 


Basophils (%) (Auto) 0.4 % 


 


Neutrophils # (Auto) 3.0 TH/MM3 


 


Lymphocytes # (Auto) 1.9 TH/MM3 


 


Monocytes # (Auto) 0.4 TH/MM3 


 


Eosinophils # (Auto) 0.2 TH/MM3 


 


Basophils # (Auto) 0.0 TH/MM3 


 


CBC Comment DIFF FINAL 


 


Differential Comment  


 


Prothrombin Time 10.7 SEC 


 


Prothromb Time International


Ratio 1.1 RATIO 


 


 


Activated Partial


Thromboplast Time 26.3 SEC 


 


 


Blood Urea Nitrogen 10 MG/DL 


 


Creatinine 0.61 MG/DL 


 


Random Glucose 90 MG/DL 


 


Calcium Level 8.9 MG/DL 


 


Sodium Level 140 MEQ/L 


 


Potassium Level 4.1 MEQ/L 


 


Chloride Level 106 MEQ/L 


 


Carbon Dioxide Level 28.3 MEQ/L 


 


Anion Gap 6 MEQ/L 


 


Estimat Glomerular Filtration


Rate 94 ML/MIN 


 











MDM


Medical Decision Making


Medical Screen Exam Complete:  Yes


Emergency Medical Condition:  Yes


Differential Diagnosis


Contusion vs. ICH vs. fracture


Narrative Course


82yo F sent her for CT scan after what appears to be mechanical fall 2 days 

ago.  Pt is with her friend and is acting like herself.  CT cervical spine 

showed no acute fracture or prevertebral soft tissue swelling. Bilateral hand 

xray negative.   Xray right wrist negative.  CT brain showed two left 

parafalcine high density collections within left parietal region measuring 2.3 

x 0.6cm and 0.9cm x 0.4cm suggestive of probably acute subdural hematomas.  CT 

facial showed acute fracture lateral wall of left orbit as well as anterior, 

medial and inferior walls of left maxillary sinus.  Consult placed for 

maxillofacial and discussed with Dr. Veliz who is aware of the patient.  Pt 

denies any visual changes and extraocular muscles intact.  Labs reviewed, no 

leukocytosis.  H/H normal.  BMP unremarkable.  Discussed with Dr. Mariee (

neurosurgery) and he came to evaluate the patient.  Recommends repeat CT brain 

in morning and neurochecks Q2hr.  Discussed with intensivist Dr. Ybarra and 

accepted to his service.  Pt's son Cirilo is the power of  and lives in 

[place] [phone].


Critical Care Narrative


Aggregate critical care time was 40 minutes.  Time to perform other separately 

billable procedures was not included in the critical care time.  My time did 

not include minutes spent treating any other patients simultaneously or on 

activities that did not directly contribute to the patient's treatment.





The services I provided to this patient were to treat and/or prevent clinically 

significant deterioration that could result in: cardiovascular collapse or 

death.





I provided critical care services requiring my management, as noted below:


Chart data review, documentation time, medication orders and management, vital 

sign 


assessments/reviewing monitor data, ordering and reviewing lab tests, ordering 

and interpreting/reviewing x-


rays and diagnostic studies, care of the patient and discussion of the patient 

with the admitting physicians.





Diagnosis





 Primary Impression:  


 Subdural bleeding





Admitting Information


Admitting Physician Requests:  Shannon Johnson DO Dec 16, 2017 10:54

## 2017-12-17 VITALS
TEMPERATURE: 98.2 F | SYSTOLIC BLOOD PRESSURE: 102 MMHG | DIASTOLIC BLOOD PRESSURE: 50 MMHG | OXYGEN SATURATION: 93 % | HEART RATE: 70 BPM | RESPIRATION RATE: 20 BRPM

## 2017-12-17 VITALS — HEART RATE: 56 BPM

## 2017-12-17 VITALS
OXYGEN SATURATION: 99 % | HEART RATE: 67 BPM | SYSTOLIC BLOOD PRESSURE: 124 MMHG | RESPIRATION RATE: 17 BRPM | DIASTOLIC BLOOD PRESSURE: 68 MMHG | TEMPERATURE: 98.3 F

## 2017-12-17 VITALS
OXYGEN SATURATION: 97 % | SYSTOLIC BLOOD PRESSURE: 114 MMHG | HEART RATE: 67 BPM | TEMPERATURE: 98 F | RESPIRATION RATE: 12 BRPM | DIASTOLIC BLOOD PRESSURE: 73 MMHG

## 2017-12-17 VITALS
TEMPERATURE: 97.6 F | HEART RATE: 67 BPM | OXYGEN SATURATION: 98 % | DIASTOLIC BLOOD PRESSURE: 80 MMHG | RESPIRATION RATE: 18 BRPM | SYSTOLIC BLOOD PRESSURE: 126 MMHG

## 2017-12-17 VITALS — HEART RATE: 61 BPM

## 2017-12-17 VITALS — HEART RATE: 65 BPM

## 2017-12-17 VITALS — HEART RATE: 80 BPM

## 2017-12-17 LAB
ALP SERPL-CCNC: 89 U/L (ref 45–117)
ALT SERPL-CCNC: 21 U/L (ref 10–53)
ANION GAP SERPL CALC-SCNC: 7 MEQ/L (ref 5–15)
AST SERPL-CCNC: 19 U/L (ref 15–37)
BASOPHILS # BLD AUTO: 0 TH/MM3 (ref 0–0.2)
BASOPHILS NFR BLD: 0.6 % (ref 0–2)
BILIRUB SERPL-MCNC: 0.8 MG/DL (ref 0.2–1)
BUN SERPL-MCNC: 13 MG/DL (ref 7–18)
CHLORIDE SERPL-SCNC: 108 MEQ/L (ref 98–107)
EOSINOPHIL # BLD: 0.1 TH/MM3 (ref 0–0.4)
EOSINOPHIL NFR BLD: 2.2 % (ref 0–4)
ERYTHROCYTE [DISTWIDTH] IN BLOOD BY AUTOMATED COUNT: 13.4 % (ref 11.6–17.2)
GFR SERPLBLD BASED ON 1.73 SQ M-ARVRAT: 80 ML/MIN (ref 89–?)
HCO3 BLD-SCNC: 25.1 MEQ/L (ref 21–32)
HCT VFR BLD CALC: 42.9 % (ref 35–46)
HEMO FLAGS: (no result)
LYMPHOCYTES # BLD AUTO: 1.9 TH/MM3 (ref 1–4.8)
LYMPHOCYTES NFR BLD AUTO: 32.2 % (ref 9–44)
MCH RBC QN AUTO: 29.2 PG (ref 27–34)
MCHC RBC AUTO-ENTMCNC: 33.6 % (ref 32–36)
MCV RBC AUTO: 86.9 FL (ref 80–100)
MONOCYTES NFR BLD: 10.6 % (ref 0–8)
NEUTROPHILS # BLD AUTO: 3.1 TH/MM3 (ref 1.8–7.7)
NEUTROPHILS NFR BLD AUTO: 54.4 % (ref 16–70)
PLATELET # BLD: 191 TH/MM3 (ref 150–450)
POTASSIUM SERPL-SCNC: 3.6 MEQ/L (ref 3.5–5.1)
RBC # BLD AUTO: 4.93 MIL/MM3 (ref 4–5.3)
SODIUM SERPL-SCNC: 140 MEQ/L (ref 136–145)
WBC # BLD AUTO: 5.8 TH/MM3 (ref 4–11)

## 2017-12-17 RX ADMIN — ACETAMINOPHEN PRN MG: 325 TABLET ORAL at 10:57

## 2017-12-17 RX ADMIN — PHENYTOIN SODIUM SCH MLS/HR: 50 INJECTION INTRAMUSCULAR; INTRAVENOUS at 01:55

## 2017-12-17 RX ADMIN — PACLITAXEL SCH MG: 6 INJECTION, SOLUTION, CONCENTRATE INTRAVENOUS at 08:19

## 2017-12-17 RX ADMIN — TEMAZEPAM PRN MG: 15 CAPSULE ORAL at 20:42

## 2017-12-17 RX ADMIN — STANDARDIZED SENNA CONCENTRATE AND DOCUSATE SODIUM SCH TAB: 8.6; 5 TABLET, FILM COATED ORAL at 08:19

## 2017-12-17 RX ADMIN — STANDARDIZED SENNA CONCENTRATE AND DOCUSATE SODIUM SCH TAB: 8.6; 5 TABLET, FILM COATED ORAL at 20:42

## 2017-12-17 RX ADMIN — Medication SCH ML: at 08:20

## 2017-12-17 RX ADMIN — LISINOPRIL SCH MG: 20 TABLET ORAL at 08:19

## 2017-12-17 RX ADMIN — CHLORHEXIDINE GLUCONATE SCH PACK: 500 CLOTH TOPICAL at 04:00

## 2017-12-17 RX ADMIN — LEVOTHYROXINE SODIUM SCH MCG: 50 TABLET ORAL at 06:23

## 2017-12-17 RX ADMIN — Medication SCH ML: at 21:00

## 2017-12-17 RX ADMIN — FAMOTIDINE SCH MG: 10 INJECTION, SOLUTION INTRAVENOUS at 08:19

## 2017-12-17 RX ADMIN — METOPROLOL SUCCINATE SCH MG: 50 TABLET, FILM COATED, EXTENDED RELEASE ORAL at 20:42

## 2017-12-17 NOTE — HHI.NSPN
__________________________________________________





History


Interval History


82y/o female who sustained a fall at home 2 days ago. Patient admitted for 

observation


Reports doing well. Offers no complaints


System Review Comments


no change





Exam


Results





Vital Signs








  Date Time  Temp Pulse Resp B/P (MAP) Pulse Ox O2 Delivery O2 Flow Rate FiO2


 


12/17/17 10:00  80      


 


12/17/17 08:00 98.3  17 124/68 (86) 99   


 


12/17/17 07:00      Room Air  














Intake and Output   


 


 12/17/17 12/17/17 12/18/17





 08:00 16:00 00:00


 


Intake Total 120 ml  


 


Output Total 600 ml  


 


Balance -480 ml  








Physical Examination


Alert and awake, Follows commands, right handed, oriented x 3, 


CN: Hard of hearing Pupils equal and reactive


Motor 5/5


Sensory intact to touch


DTRs UE 1+ KJ 2+ ankle neg


Silent babinski


Mood much improved


Lab, Micro, Other Results


CT reviewed No change





Medical Decision Making


Impression and Plan


Imp: Falcine hemorrhage. No surgery needed





Rec: Transfer to floor


Total Minutes:  15











Lucio Mariee MD Dec 17, 2017 11:06

## 2017-12-17 NOTE — HHI.CCPN
Subjective


Remarks/Hospital Course


83 year old  female with past medical history significant for 

hypertension, dyslipidemia, hypothyroidism, anxiety and depression.  She was 

sent from her primary care doctor's office for a CT of the head due to a fall 

sustained 2 days ago.  She slipped and fell and hit her face on a bedside 

table.  Post fall she had a headache but hasn't resolved now.  Stat CT of the 

head showed two left parafalcine subdural hemorrhages within the left parietal 

region measuring 2.3 x 0.6 cm and 0.9 x 0.4 cm. CT of the facial bones showed 

acute fractures involving the lateral wall of left orbit, anterior, medial and 

inferior walls of the left maxillary sinus. I evaluated the patient in the ICU. 

Denies headache or any focal weakness. Had no seizures post fall. Patient is 

hypertensive. When necessary hydralazine ordered and home antihypertensives had 

been started. Neurosurgeon has seen the patient and as well as oral and 

maxillofacial surgeon. Follow-up CT of the head planned for a.m.





SUBJ 12/17: No acute events overnight.  CT of the head remained stable.  No 

focal deficits. D/w Neurosurgery





Objective





Vital Signs








  Date Time  Temp Pulse Resp B/P (MAP) Pulse Ox O2 Delivery O2 Flow Rate FiO2


 


12/17/17 06:00  56      


 


12/17/17 04:00 98.0  12 114/73 (87) 97   


 


12/16/17 20:00      Room Air  














Intake and Output   


 


 12/17/17 12/17/17 12/18/17





 08:00 16:00 00:00


 


Intake Total 120 ml  


 


Output Total 600 ml  


 


Balance -480 ml  








Result Diagram:  


12/17/17 0420                                                                  

              12/17/17 0420





Imaging


CT of the head showed two left parafalcine subdural hemorrhages left parietal 

region measuring 2.3 x 0.6 cm and 0.9 x 0.4 cm.


CT of the facial bones showed acute fractures of the lateral wall of left orbit

, anterior, medial and inferior walls of the left maxillary sinus.


Objective Remarks


GENERAL: Elderly  female not in any acute distress


SKIN: Warm and dry


HEAD: Ecchymoses in left maxillary region.  


EYES: Pupils equal and round. No scleral icterus. 


ENT: No nasal bleeding or discharge. Airway patent


NECK: Trachea midline. No JVD. 


CARDIOVASCULAR: Regular rate and rhythm.  No murmur appreciated.


RESPIRATORY: No accessory muscle use. Clear to auscultation. 


GASTROINTESTINAL: Abdomen soft, non-tender, nondistended. 


MUSCULOSKELETAL: Swelling of right thumb and wrist. Sensation intact. Left hand

, mild erythema


NEUROLOGICAL: Awake and alert. No obvious cranial nerve deficits.  Motor 

grossly within normal limits.





A/P


Assessment and Plan


NEURO: 


Status post fall


Acute falcine subdural hemorrhage


Left orbital and maxillary sinus fracture


- Neurosurgery has already seen patient no surgical intervention needed at this 

time


- CT of the head today stable


- Keep sodium normal


- OMFS consult appreciated, no surgical indication 





RESP: 


- Nasal cannula oxygen


- Aggressive pulmonary toilet





CV: 


Uncontrolled hypertension 


History of hypertension 


Dyslipidemia 


- Normal saline IV fluids 100 ml per hour-reduce to KVO


- Hydralazine 20 mg IV every 6 hours when necessary for SBP more than 160. 

Target systolic blood pressure less than 160


- Continue home medications including lisinopril, metoprolol, amlodipine, 

continue statin





GI: 


- Heart healthy diet


- IV famotidine-change to PO





: 


- Monitor renal function closely.  Continue IV hydration





ID: 


- No antibiotics at this time





HEME: 


- Monitor CBC, CMP, coags





ENDO:


- Electrolyte replacement per protocol





PROPH: 


- Bilateral lower extremity SCDs, BRAYAN. Avoid chemical DVT prophylaxis





LINES: 


- Utilize peripheral IVs, central line if needed





Level 2





D/W Neurosurgery, transfer to med surg, hospitalist to assume care in 











Jose Ybarra MD Dec 17, 2017 09:20

## 2017-12-17 NOTE — RADRPT
EXAM DATE/TIME:  12/17/2017 06:00 

 

[facility] COMPARISON:     

CT BRAIN W/O CONTRAST, December 16, 2017, 10:19

 

 

INDICATIONS :     

Follow up subdural hematoma.

                      

 

RADIATION DOSE:     

5.35 CTDIvol (mGy) 

 

 

 

MEDICAL HISTORY :     

Hypertension.  

 

SURGICAL HISTORY :      

Hysterectomy. 

 

ENCOUNTER:      

Subsequent

 

ACUITY:      

3 days

 

PAIN SCALE:      

Non-responsive

 

LOCATION:        

cranial 

 

TECHNIQUE:     

Multiple contiguous axial images were obtained of the head.  Using automated exposure control and adj
ustment of the mA and/or kV according to patient size, radiation dose was kept as low as reasonably a
chievable to obtain optimal diagnostic quality images.   DICOM format image data is available electro
nically for review and comparison.  

 

FINDINGS:     

Small perifalcine subdural blood of the left frontal lobe unchanged, measures approximately 6 mm in m
aximal thickness. No new blood. No mass effect or midline shift. No evidence of an acute ischemic maria l
nt.

 

CONCLUSION:     

Small left frontal perifalcine subdural blood is unchanged. No new blood. No midline shift.

 

 

 

 Tate Garcia MD on December 17, 2017 at 6:17           

Board Certified Radiologist.

 This report was verified electronically.

## 2017-12-18 VITALS
SYSTOLIC BLOOD PRESSURE: 124 MMHG | TEMPERATURE: 97.6 F | HEART RATE: 74 BPM | DIASTOLIC BLOOD PRESSURE: 58 MMHG | OXYGEN SATURATION: 94 % | RESPIRATION RATE: 18 BRPM

## 2017-12-18 VITALS
SYSTOLIC BLOOD PRESSURE: 130 MMHG | RESPIRATION RATE: 19 BRPM | OXYGEN SATURATION: 96 % | HEART RATE: 90 BPM | DIASTOLIC BLOOD PRESSURE: 61 MMHG | TEMPERATURE: 98.6 F

## 2017-12-18 VITALS
DIASTOLIC BLOOD PRESSURE: 66 MMHG | HEART RATE: 70 BPM | TEMPERATURE: 98.6 F | RESPIRATION RATE: 19 BRPM | SYSTOLIC BLOOD PRESSURE: 147 MMHG | OXYGEN SATURATION: 96 %

## 2017-12-18 VITALS
TEMPERATURE: 97.6 F | HEART RATE: 66 BPM | RESPIRATION RATE: 18 BRPM | SYSTOLIC BLOOD PRESSURE: 188 MMHG | DIASTOLIC BLOOD PRESSURE: 78 MMHG | OXYGEN SATURATION: 96 %

## 2017-12-18 VITALS — HEART RATE: 79 BPM

## 2017-12-18 RX ADMIN — METOPROLOL SUCCINATE SCH MG: 50 TABLET, FILM COATED, EXTENDED RELEASE ORAL at 22:00

## 2017-12-18 RX ADMIN — STANDARDIZED SENNA CONCENTRATE AND DOCUSATE SODIUM SCH TAB: 8.6; 5 TABLET, FILM COATED ORAL at 08:30

## 2017-12-18 RX ADMIN — STANDARDIZED SENNA CONCENTRATE AND DOCUSATE SODIUM SCH TAB: 8.6; 5 TABLET, FILM COATED ORAL at 22:00

## 2017-12-18 RX ADMIN — CHLORHEXIDINE GLUCONATE SCH PACK: 500 CLOTH TOPICAL at 03:48

## 2017-12-18 RX ADMIN — LISINOPRIL SCH MG: 20 TABLET ORAL at 08:30

## 2017-12-18 RX ADMIN — Medication SCH ML: at 22:00

## 2017-12-18 RX ADMIN — ACETAMINOPHEN PRN MG: 325 TABLET ORAL at 16:02

## 2017-12-18 RX ADMIN — PHENYTOIN SODIUM SCH MLS/HR: 50 INJECTION INTRAMUSCULAR; INTRAVENOUS at 22:21

## 2017-12-18 RX ADMIN — Medication SCH ML: at 08:29

## 2017-12-18 RX ADMIN — LEVOTHYROXINE SODIUM SCH MCG: 50 TABLET ORAL at 05:55

## 2017-12-18 RX ADMIN — PACLITAXEL SCH MG: 6 INJECTION, SOLUTION, CONCENTRATE INTRAVENOUS at 08:30

## 2017-12-18 NOTE — HHI.PR
Subjective


Remarks


No new complaints today


Pt tolerating diet ok





Objective


Vitals





Vital Signs








  Date Time  Temp Pulse Resp B/P (MAP) Pulse Ox O2 Delivery O2 Flow Rate FiO2


 


12/18/17 12:54 98.6 90 19 130/61 (84) 96   


 


12/18/17 08:36      Room Air  


 


12/18/17 08:09 97.6 66 18 188/78 (114) 96   


 


12/17/17 21:30 97.6 67 18 126/80 (95) 98   








Result Diagram:  


12/17/17 0420                                                                  

              12/17/17 0420





Other Results





 Laboratory Tests








Test


  12/16/17


16:00 12/17/17


04:20


 


Nasal Screen MRSA (PCR) MRSA DETECTED  


 


White Blood Count  5.8 TH/MM3 


 


Red Blood Count  4.93 MIL/MM3 


 


Hemoglobin  14.4 GM/DL 


 


Hematocrit  42.9 % 


 


Mean Corpuscular Volume  86.9 FL 


 


Mean Corpuscular Hemoglobin  29.2 PG 


 


Mean Corpuscular Hemoglobin


Concent 


  33.6 % 


 


 


Red Cell Distribution Width  13.4 % 


 


Platelet Count  191 TH/MM3 


 


Mean Platelet Volume  9.1 FL 


 


Neutrophils (%) (Auto)  54.4 % 


 


Lymphocytes (%) (Auto)  32.2 % 


 


Monocytes (%) (Auto)  10.6 % 


 


Eosinophils (%) (Auto)  2.2 % 


 


Basophils (%) (Auto)  0.6 % 


 


Neutrophils # (Auto)  3.1 TH/MM3 


 


Lymphocytes # (Auto)  1.9 TH/MM3 


 


Monocytes # (Auto)  0.6 TH/MM3 


 


Eosinophils # (Auto)  0.1 TH/MM3 


 


Basophils # (Auto)  0.0 TH/MM3 


 


CBC Comment  DIFF FINAL 


 


Differential Comment   


 


Blood Urea Nitrogen  13 MG/DL 


 


Creatinine  0.70 MG/DL 


 


Random Glucose  96 MG/DL 


 


Total Protein  6.9 GM/DL 


 


Albumin  3.7 GM/DL 


 


Calcium Level  9.0 MG/DL 


 


Alkaline Phosphatase  89 U/L 


 


Aspartate Amino Transf


(AST/SGOT) 


  19 U/L 


 


 


Alanine Aminotransferase


(ALT/SGPT) 


  21 U/L 


 


 


Total Bilirubin  0.8 MG/DL 


 


Sodium Level  140 MEQ/L 


 


Potassium Level  3.6 MEQ/L 


 


Chloride Level  108 MEQ/L 


 


Carbon Dioxide Level  25.1 MEQ/L 


 


Anion Gap  7 MEQ/L 


 


Estimat Glomerular Filtration


Rate 


  80 ML/MIN 


 








Imaging





Last Impressions








Head CT 12/17/17 0600 Signed





Impressions: 





 Service Date/Time:  Sunday, December 17, 2017 06:00 - CONCLUSION:  Small left 





 frontal perifalcine subdural blood is unchanged. No new blood. No midline 

shift. 





     Tate Garcia MD 


 


Wrist X-Ray 12/16/17 0000 Signed





Impressions: 





 Service Date/Time:  Saturday, December 16, 2017 11:16 - CONCLUSION:  1. No 

acute 





 fracture or dislocation.  2. Possible old ununited ulnar styloid fracture.  3. 





 Diffuse osteoporosis.     Perez Squires MD 


 


Maxillofacial CT 12/16/17 0000 Signed





Impressions: 





 Service Date/Time:  Saturday, December 16, 2017 10:23 - CONCLUSION:  1. Acute 





 fractures involving the lateral wall of left orbit, as well as the anterior, 





 medial and inferior walls of the left maxillary sinus.  2. Minimal mucosal 





 thickening is noted involving the maxillary sinus     Perez Squires MD 


 


Hand X-Ray 12/16/17 0000 Signed





Impressions: 





 Service Date/Time:  Saturday, December 16, 2017 11:13 - CONCLUSION:  1. No 

acute 





 fracture or dislocation.  2. Diffuse osteoporosis.  3. No significant joint 





 space narrowing noted.     Perez Squires MD 


 


Cervical Spine CT 12/16/17 0000 Signed





Impressions: 





 Service Date/Time:  Saturday, December 16, 2017 10:23 - CONCLUSION:  1. No 

acute 





 fracture or prevertebral soft tissue swelling.  2. Moderate spinal stenosis at 





 C5-6 and mild spinal stenosis at C4-5 and C6-7.  3. Moderate bilateral 

foraminal 





 narrowing at C3-4, C4-5, C5-6 and C6-7 and mild bilateral foraminal narrowing 

at 





 C7-T1.          Perez Squires MD 








Objective Remarks


General: NAD, AAOx3


Chest: CTA bilaterally


Cardiac: Regular


Abd: +BS, soft ND/NT


Ext: No edema





A/P


Problem List:  


(1) Acute subdural hemorrhage


Status:  Acute


Plan:  


- Pt is an 84 y/o WF with hypertension, dyslipidemia, hypothyroidism, anxiety 

and depression. 


 Pt was sent from her primary care doctor's office on 12/16 for a CT of the 

head to evaluate after a 


 fall she sustained 2 days prior to admission.  She had slipped and fell and 

hit her face on a bedside


 table. 


- CT of the head (12/16) showed two left parafalcine subdural hemorrhages 

within the left parietal region 


 measuring 2.3 x 0.6 cm and 0.9 x 0.4 cm. 


- CT of the facial bones showed acute fractures involving the lateral wall of 

left orbit, anterior, medial 


 and inferior walls of the left maxillary sinus. 


- Pt was admitted to the ICU with Neurosurgical consultation as well as oral 

and maxillofacial surgeon. 


- Follow-up CT of the head (12/17) with small left frontal parafalcine subdural 

blood is unchanged. 


 No new blood. No midline shift. 


- Pt ambulated with PT today and was recommended for HHC/PT, but she only 

ambulated about 10' x 2


- Will need to await Neurosurgical evaluation today to determine when pt is 

able to be discharged. 


- Pt would probably benefit more from SNF as she lives at home by herself. 


- The case was discussed between Dr. Tate and the pts son, Cirilo. 


- Supportive care





- DVT prophylaxis with SCDs





(2) Facial fracture due to fall


ICD Codes:  S02.92XA - Unspecified fracture of facial bones, initial encounter 

for closed fracture; W19.XXXA - Unspecified fall, initial encounter


Status:  Acute


Plan:  


- Pt was evaluated by OMFS during this admission for left maxillary sinus 

fracture which was felt to 


 be stable with no need for surgical intervention. 





(3) Hypothyroidism


ICD Codes:  E03.9 - Hypothyroidism, unspecified


Status:  Chronic


Plan:  


- Cont. home meds





(4) Hyperlipidemia


ICD Codes:  E78.5 - Hyperlipidemia, unspecified


Status:  Chronic


Plan:  


- Cont. home meds





(5) Essential tremor


ICD Codes:  G25.0 - Essential tremor


Status:  Chronic


Plan:  


- Pt follows with Dr. Bender as an outpt








Problem Qualifiers





(1) Facial fracture due to fall:  


Qualified Codes:  S02.92XA - Unspecified fracture of facial bones, initial 

encounter for closed fracture; W19.XXXA - Unspecified fall, initial encounter








Bambi Ashley Dec 18, 2017 14:43

## 2017-12-18 NOTE — HHI.NSPN
__________________________________________________


 (Marlo Davalos)





History


Chief Complaint:  Slight headache and aching to the back of the head. 


 (Marlo Davalos)


Interval History


12/16: This is an 83-year-old female patient with multiple medical problems.  

She was seen today at her primary care physician's office and sent to [facility] 

for a CT scan.  The patient reports that approximately two days ago she got up 

in the middle of the night and upon return to her bed,  she slipped and fell 

hitting her face on the bedside table.  She denies any loss of consciousness. 

Because of a persistent mild headache, she was seen by her primary care 

physician today and sent as stated above for a CT scan. Because of abnormal CT 

scan, neurosurgery consultation has been placed.


 


Presently the patient complains of a mild headache, which is intermittent.  She 

denies any other problems.


12/17: 82y/o female who sustained a fall at home 2 days ago. Patient admitted 

for observation


Reports doing well. Offers no complaints 


12/18: The patient is doing well when seen. She states she does have some pain 

to the back of the head as well as a slight headache. She also says she does 

have some pain to the left arm. She denies any dizziness. 


 (Marlo Davalos)


System Review Comments


HEENT: Some pain to the back of the head. 


MUSCULOSKELETAL: Left arm hurts. 


NEUROLOGICAL: Slight headache. Some numbness to the left maxilla region. 

Essential tremors. 


 (Marlo Davalos)





Exam


Results











 12/16/17 12/16/17 12/17/17 12/17/17 12/18/17 12/18/17





 06:00 18:00 06:00 18:00 06:00 18:00


 


Intake Total  370 ml 120 ml  1300 ml 


 


Output Total  400 ml 600 ml   


 


Balance  -30 ml -480 ml  1300 ml 


 


      


 


Intake Oral  120 ml 120 ml  950 ml 


 


IV Total  250 ml    


 


Tube Feeding     350 ml 


 


Output Urine Total  400 ml 600 ml   


 


# Voids     3 


 


# Bowel Movements  0 0  0 








Vital Signs








  Date Time  Temp Pulse Resp B/P (MAP) Pulse Ox O2 Delivery O2 Flow Rate FiO2


 


12/18/17 12:54 98.6 90 19 130/61 (84) 96   


 


12/18/17 08:36      Room Air  


 


12/18/17 08:09 97.6 66 18 188/78 (114) 96   


 


12/17/17 21:30 97.6 67 18 126/80 (95) 98   


 


12/17/17 12:12  65      


 


12/17/17 10:00  80      


 


12/17/17 08:00 98.3 67 17 124/68 (86) 99   


 


12/17/17 08:00  67      


 


12/17/17 07:00     99 Room Air  


 


12/17/17 06:00  56      


 


12/17/17 04:00  67      


 


12/17/17 04:00 98.0 67 12 114/73 (87) 97   


 


12/17/17 02:00  61      


 


12/17/17 00:00  70      


 


12/17/17 00:00 98.2 70 20 102/50 (67) 93   


 


12/16/17 22:00  86      


 


12/16/17 20:00 98.4 86 16 125/59 (81) 98   


 


12/16/17 20:00      Room Air  


 


12/16/17 20:00  87      


 


12/16/17 18:00  85      


 


12/16/17 16:00 98.2 64 15 180/70 (106) 95   


 


12/16/17 16:00  68      


 


12/16/17 15:06        


 


12/16/17 15:00     95 Room Air  


 


12/16/17 15:00 97.5 63 20 181/77 (111) 100   


 


12/16/17 13:00  64 14 163/71 (101) 96 Room Air  


 


12/16/17 11:00  71 18 153/70 (97) 96 Room Air  


 


12/16/17 09:54  65 15 200/74 (116) 99 Room Air  


 


12/16/17 09:26 98.2 67 18 186/82 (116) 99   








 (Marlo Davalos)


Physical Examination


GENERAL: The patient is awake & alert in bed, her affect is essentially normal, 

no apparent distress. 


HEENT: Mild suborbital swelling & ecchymosis on the left which is TTP and w/

decreased sensation. PERRLA 3 mm brisk, EOMI. No otorrhea or rhinorrhea. MMM, 

pink & tongue protrudes to midline. 


MUSCULOSKELETAL: POE w/o difficulty, left arm mildly TTP, no evident clubbing 

or deformity. Mild tremors noted to both upper extremities. 


NEUROLOGICAL: 


AAOx3. 


Speech essentially clear & appropriate. 


Follows simple commands w/o difficulty. 


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 

to left maxilla region, o/w CN II-XII appear grossly intact. 


Sensation intact to light touch to the extremities. 


Motor strength is 5/5 to all major flexion & extension muscle groups. 


 (Marlo Davalos)


Lab, Micro, Other Results





Recent Impressions








Head CT 12/17/17 0600 Signed





Impressions: 





 Service Date/Time:  Sunday, December 17, 2017 06:00 - CONCLUSION:  Small left 





 frontal perifalcine subdural blood is unchanged. No new blood. No midline 

shift. 





     Tate Garcia MD 


 


Wrist X-Ray 12/16/17 0000 Signed





Impressions: 





 Service Date/Time:  Saturday, December 16, 2017 11:16 - CONCLUSION:  1. No 

acute 





 fracture or dislocation.  2. Possible old ununited ulnar styloid fracture.  3. 





 Diffuse osteoporosis.     Perez Squires MD 


 


Maxillofacial CT 12/16/17 0000 Signed





Impressions: 





 Service Date/Time:  Saturday, December 16, 2017 10:23 - CONCLUSION:  1. Acute 





 fractures involving the lateral wall of left orbit, as well as the anterior, 





 medial and inferior walls of the left maxillary sinus.  2. Minimal mucosal 





 thickening is noted involving the maxillary sinus     Perez Squires MD 


 


Head CT 12/16/17 0000 Signed





Impressions: 





 Service Date/Time:  Saturday, December 16, 2017 10:19 - CONCLUSION: Two left 





 parafalcine high density collections within the left parietal region measuring 





 2.3 x 0.6 cm and 0.9 x 0.4 cm suggestive of probable acute subdural hematomas.

   





       Perez Squires MD 


 


Hand X-Ray 12/16/17 0000 Signed





Impressions: 





 Service Date/Time:  Saturday, December 16, 2017 11:13 - CONCLUSION:  1. No 

acute 





 fracture or dislocation.  2. Diffuse osteoporosis.  3. No significant joint 





 space narrowing noted.     Perez Squires MD 


 


Hand X-Ray 12/16/17 0000 Signed





Impressions: 





 Service Date/Time:  Saturday, December 16, 2017 11:17 - CONCLUSION:  1. No 

acute 





 fracture or dislocation.  2. Mild osteoarthritis as described above.  3. 





 Probable old ununited ulnar styloid process fracture.  4. Diffuse 

osteoporosis.  





    Perez Squires MD 


 


Cervical Spine CT 12/16/17 0000 Signed





Impressions: 





 Service Date/Time:  Saturday, December 16, 2017 10:23 - CONCLUSION:  1. No 

acute 





 fracture or prevertebral soft tissue swelling.  2. Moderate spinal stenosis at 





 C5-6 and mild spinal stenosis at C4-5 and C6-7.  3. Moderate bilateral 

foraminal 





 narrowing at C3-4, C4-5, C5-6 and C6-7 and mild bilateral foraminal narrowing 

at 





 C7-T1.          Perez Squires MD 








Laboratory Tests








Test


  12/16/17


12:00 12/16/17


16:00 12/17/17


04:20


 


White Blood Count 5.6 TH/MM3   5.8 TH/MM3 


 


Red Blood Count 4.63 MIL/MM3   4.93 MIL/MM3 


 


Hemoglobin 13.6 GM/DL   14.4 GM/DL 


 


Hematocrit 40.7 %   42.9 % 


 


Mean Corpuscular Volume 87.8 FL   86.9 FL 


 


Mean Corpuscular Hemoglobin 29.4 PG   29.2 PG 


 


Mean Corpuscular Hemoglobin


Concent 33.5 % 


  


  33.6 % 


 


 


Red Cell Distribution Width 13.0 %   13.4 % 


 


Platelet Count 179 TH/MM3   191 TH/MM3 


 


Mean Platelet Volume 9.1 FL   9.1 FL 


 


Neutrophils (%) (Auto) 54.4 %   54.4 % 


 


Lymphocytes (%) (Auto) 34.2 %   32.2 % 


 


Monocytes (%) (Auto) 8.0 %   10.6 % 


 


Eosinophils (%) (Auto) 3.0 %   2.2 % 


 


Basophils (%) (Auto) 0.4 %   0.6 % 


 


Neutrophils # (Auto) 3.0 TH/MM3   3.1 TH/MM3 


 


Lymphocytes # (Auto) 1.9 TH/MM3   1.9 TH/MM3 


 


Monocytes # (Auto) 0.4 TH/MM3   0.6 TH/MM3 


 


Eosinophils # (Auto) 0.2 TH/MM3   0.1 TH/MM3 


 


Basophils # (Auto) 0.0 TH/MM3   0.0 TH/MM3 


 


CBC Comment DIFF FINAL   DIFF FINAL 


 


Differential Comment     


 


Prothrombin Time 10.7 SEC   


 


Prothromb Time International


Ratio 1.1 RATIO 


  


  


 


 


Activated Partial


Thromboplast Time 26.3 SEC 


  


  


 


 


Blood Urea Nitrogen 10 MG/DL   13 MG/DL 


 


Creatinine 0.61 MG/DL   0.70 MG/DL 


 


Random Glucose 90 MG/DL   96 MG/DL 


 


Calcium Level 8.9 MG/DL   9.0 MG/DL 


 


Sodium Level 140 MEQ/L   140 MEQ/L 


 


Potassium Level 4.1 MEQ/L   3.6 MEQ/L 


 


Chloride Level 106 MEQ/L   108 MEQ/L 


 


Carbon Dioxide Level 28.3 MEQ/L   25.1 MEQ/L 


 


Anion Gap 6 MEQ/L   7 MEQ/L 


 


Estimat Glomerular Filtration


Rate 94 ML/MIN 


  


  80 ML/MIN 


 


 


Nasal Screen MRSA (PCR)  MRSA DETECTED  


 


Total Protein   6.9 GM/DL 


 


Albumin   3.7 GM/DL 


 


Alkaline Phosphatase   89 U/L 


 


Aspartate Amino Transf


(AST/SGOT) 


  


  19 U/L 


 


 


Alanine Aminotransferase


(ALT/SGPT) 


  


  21 U/L 


 


 


Total Bilirubin   0.8 MG/DL 








 (Marlo Davalos)





Medical Decision Making


Impression and Plan


Impression: 


1.) Falcine haemorrhage 





The patient is doing well, she does have a slight headache, aching to the 

posterior head and decreased sensation to the left maxilla region. 


Essential tremors. 





Plan: 


Primary management per 


Nonsurgical. 


Neuro checks. 


Stat CT brain for any decline in neuro status. 


Mobilise patient w/assistance. 


PT/OT eval & tx. 


Patient would benefit from short stay at SNF for therapy. 


 (Marlo Davalos)





Attending Statement


The exam, history, and the medical decision-making described in the above note 

were completed with the assistance of the mid-level provider. I reviewed and 

agree with the findings presented.  I attest that I had a face-to-face 

encounter with the patient on the same day, and personally performed and 

documented my assessment and findings in the medical record.


On my examination 12/18/2017, the patient is awake and alert.  She has some 

speech difficulty which she states is her baseline related to essential tremor.


She has some mild left infraorbital ecchymosis.


Otherwise a nonfocal cranial nerve and extremity sensory motor examination.


Her CT scan images of been reviewed.


Findings were discussed with the patient.


She is stable for discharge from neurosurgical standpoint.


Disposition pending-home health care versus skilled nursing.


 (Sivakumar Jenkins MD)











Marlo Davalos Dec 18, 2017 16:18


Sivakumar Jenkins MD Dec 18, 2017 20:39

## 2017-12-19 VITALS
TEMPERATURE: 98.4 F | OXYGEN SATURATION: 97 % | RESPIRATION RATE: 18 BRPM | DIASTOLIC BLOOD PRESSURE: 65 MMHG | HEART RATE: 65 BPM | SYSTOLIC BLOOD PRESSURE: 148 MMHG

## 2017-12-19 VITALS — HEART RATE: 60 BPM | DIASTOLIC BLOOD PRESSURE: 63 MMHG | SYSTOLIC BLOOD PRESSURE: 146 MMHG

## 2017-12-19 VITALS
HEART RATE: 62 BPM | DIASTOLIC BLOOD PRESSURE: 66 MMHG | SYSTOLIC BLOOD PRESSURE: 144 MMHG | RESPIRATION RATE: 18 BRPM | OXYGEN SATURATION: 95 % | TEMPERATURE: 97.8 F

## 2017-12-19 VITALS
TEMPERATURE: 97.7 F | RESPIRATION RATE: 18 BRPM | OXYGEN SATURATION: 97 % | HEART RATE: 62 BPM | SYSTOLIC BLOOD PRESSURE: 151 MMHG | DIASTOLIC BLOOD PRESSURE: 70 MMHG

## 2017-12-19 VITALS
HEART RATE: 65 BPM | TEMPERATURE: 97.2 F | RESPIRATION RATE: 18 BRPM | DIASTOLIC BLOOD PRESSURE: 56 MMHG | OXYGEN SATURATION: 94 % | SYSTOLIC BLOOD PRESSURE: 105 MMHG

## 2017-12-19 VITALS — HEART RATE: 63 BPM

## 2017-12-19 VITALS — HEART RATE: 67 BPM

## 2017-12-19 RX ADMIN — ACETAMINOPHEN PRN MG: 325 TABLET ORAL at 04:24

## 2017-12-19 RX ADMIN — PHENYTOIN SODIUM SCH MLS/HR: 50 INJECTION INTRAMUSCULAR; INTRAVENOUS at 09:29

## 2017-12-19 RX ADMIN — Medication SCH ML: at 09:00

## 2017-12-19 RX ADMIN — PACLITAXEL SCH MG: 6 INJECTION, SOLUTION, CONCENTRATE INTRAVENOUS at 09:27

## 2017-12-19 RX ADMIN — LISINOPRIL SCH MG: 20 TABLET ORAL at 09:28

## 2017-12-19 RX ADMIN — STANDARDIZED SENNA CONCENTRATE AND DOCUSATE SODIUM SCH TAB: 8.6; 5 TABLET, FILM COATED ORAL at 09:00

## 2017-12-19 RX ADMIN — CHLORHEXIDINE GLUCONATE SCH PACK: 500 CLOTH TOPICAL at 04:00

## 2017-12-19 RX ADMIN — LEVOTHYROXINE SODIUM SCH MCG: 50 TABLET ORAL at 04:23

## 2018-04-14 ENCOUNTER — HOSPITAL ENCOUNTER (EMERGENCY)
Dept: HOSPITAL 17 - PHEFT | Age: 83
Discharge: HOME | End: 2018-04-14
Payer: MEDICARE

## 2018-04-14 VITALS
HEART RATE: 71 BPM | RESPIRATION RATE: 18 BRPM | TEMPERATURE: 97.8 F | SYSTOLIC BLOOD PRESSURE: 213 MMHG | OXYGEN SATURATION: 97 % | DIASTOLIC BLOOD PRESSURE: 107 MMHG

## 2018-04-14 DIAGNOSIS — K21.9: ICD-10-CM

## 2018-04-14 DIAGNOSIS — Z88.5: ICD-10-CM

## 2018-04-14 DIAGNOSIS — R00.0: ICD-10-CM

## 2018-04-14 DIAGNOSIS — Z76.0: ICD-10-CM

## 2018-04-14 DIAGNOSIS — F41.8: Primary | ICD-10-CM

## 2018-04-14 DIAGNOSIS — Z88.0: ICD-10-CM

## 2018-04-14 DIAGNOSIS — I10: ICD-10-CM

## 2018-04-14 PROCEDURE — 99281 EMR DPT VST MAYX REQ PHY/QHP: CPT

## 2018-04-14 NOTE — PD
HPI


Chief Complaint:  Medication Refill Request


Time Seen by Provider:  20:17


Travel History


International Travel<30 days:  No


Contact w/Intl Traveler<30days:  No


Traveled to known affect area:  No





History of Present Illness


HPI


This is an 83-year-old female history of anxiety disorder currently on Klonopin 

1 mg twice daily here requesting a refill.  Patient has her prescription bottle 

with her which has 5 remaining refills. she gets her prescriptions filled at 

[facility] pharmacy which is closed on the weekends.  She reports she 

took her last dose of Klonopin today before realizing she did not refill the 

medication.  She was told by her physician to not abruptly come off this 

medication which is why she presents to the ER.  She has no medical complaints.

  Severity is mild.





PFSH


Past Medical History


Arthritis:  Yes


Autoimmune Disease:  No


Blood Disorders:  No


Anxiety:  Yes


Depression:  Yes


Heart Rhythm Problems:  Yes (TACHYCARDIA)


Cancer:  No


Cardiovascular Problems:  Yes (Tachycardia)


High Cholesterol:  Yes


Chemotherapy:  No


Chest Pain:  No


Congestive Heart Failure:  No


Diabetes:  No


Diminished Hearing:  Yes ([place])


Endocrine:  Yes


Gastrointestinal Disorders:  Yes (GERD, CONSTIPATION/ DIARRHEA)


GERD:  Yes


Genitourinary:  No


Hepatitis:  No


Hiatal Hernia:  No


Hypertension:  Yes


Immune Disorder:  No


Musculoskeletal:  Yes


Neurologic:  No


Psychiatric:  Yes


Reproductive:  No


Respiratory:  No


Immunizations Current:  Yes


Myocardial Infarction:  No


Radiation Therapy:  No


Sickle Cell Disease:  No


Thyroid Disease:  Yes


Menopausal:  Yes





Past Surgical History


Abdominal Surgery:  Yes (Appendectomy)


AICD:  No


Appendectomy:  Yes


Cardiac Surgery:  No


Ear Surgery:  No


Endocrine Surgery:  No


Eye Surgery:  Yes (RIGHT PHACO CATARACT)


Genitourinary Surgery:  No


Gynecologic Surgery:  Yes (Hysterectomy, Left breast Biopsy)


Hysterectomy:  Yes


Joint Replacement:  No


Neurologic Surgery:  No


Oral Surgery:  Yes (Tonsilectomy)


Pacemaker:  No


Thoracic Surgery:  No


Tonsillectomy:  Yes


Other Surgery:  Yes





Social History


Alcohol Use:  No


Tobacco Use:  No


Substance Use:  No





Allergies-Medications


(Allergen,Severity, Reaction):  


Coded Allergies:  


     codeine (Unverified  Allergy, Severe, Nausea/Vomiting, 4/14/18)


     meperidine (Unverified  Allergy, Severe, Nausea/Vomiting, 4/14/18)


     metoprolol (Unverified  Allergy, Severe, Arrhythmias, 4/14/18)


     penicillin G (Unverified  Allergy, Severe, pt denies allergy, 4/14/18)


     risedronate sodium (Unverified  Allergy, Mild, 4/14/18)


 PT IS UNCERTAIN IF SHE IS ALLERGIC TO THIS MEDICATION


Reported Meds & Prescriptions





Reported Meds & Active Scripts


Active


Clonazepam 1 Mg Tab 1 Mg PO BID PRN


Reported


Rosadan Topical 0.75% (Metronidazole Topical 0.75%) 0.75% Gel 1 Applic TOPICAL 

BID


Lovastatin 10 Mg Tab 10 Mg PO DAILY


Toprol XL (Metoprolol Succinate) 100 Mg Tab 100 Mg PO HS


Amlodipine (Amlodipine Besylate) 5 Mg Tab 5 Mg PO DAILY


Aspirin EC (Aspirin) 81 Mg Tabdr 81 Mg PO DAILY


Calcium 500 +D (Calcium Carbonate-Cholecalciferol) 500-400 Mg-Unit Tab 1 Tab PO 

DAILY


Levoxyl (Levothyroxine Sodium) 50 Mcg Tab 50 Mcg PO DAILY


Lisinopril 20 Mg Tab 20 Mg PO DAILY


Omeprazole 20 Mg Tab 20 Mg PO DAILY








Review of Systems


Except as stated in HPI:  all other systems reviewed are Neg


General / Constitutional:  No: Fever


Eyes:  No: Visual changes


HENT:  No: Headaches


Cardiovascular:  No: Chest Pain or Discomfort


Respiratory:  No: Shortness of Breath


Gastrointestinal:  No: Abdominal Pain


Genitourinary:  No: Dysuria





Physical Exam


Narrative


GENERAL: Alert, well-appearing 83-year-old female.  No distress.


SKIN: Warm and dry.


HEAD: Normocephalic.


EYES: No scleral icterus. No injection or drainage. 


NECK: Supple


CARDIOVASCULAR: Regular rate and rhythm without murmurs, gallops, or rubs. 


RESPIRATORY: Breath sounds equal bilaterally. No accessory muscle use.


GASTROINTESTINAL: Abdomen soft, non-tender, nondistended. 


MUSCULOSKELETAL: No cyanosis, or edema. 


BACK: Nontender without obvious deformity. 








Data


Data


Last Documented VS





Vital Signs








  Date Time  Temp Pulse Resp B/P (MAP) Pulse Ox O2 Delivery O2 Flow Rate FiO2


 


4/14/18 20:07 97.8 71 18 213/107 (142) 97   











MDM


Medical Decision Making


Medical Screen Exam Complete:  Yes


Emergency Medical Condition:  Yes


Differential Diagnosis


Medication refill, anxiety disorder, other


Narrative Course


This is an 83-year-old female here requesting a 1-2 day refill of her Klonopin.

  She fills her prescriptions at [facility] pharmacy which is closed 

on the weekend.  This morning she took her last dose of Klonopin and realize 

she had forgotten to refill the medication.  She has a prescription bottle with 

5 refills remaining.   the dates on the bottle confirm that she refilled her 

last script 1 month prior.  She is a reliable patient.  She will be given a 

prescription for 2 days of Klonopin.





Diagnosis





 Primary Impression:  


 Medication refill


Referrals:  


Primary Care Physician





***Additional Instructions:  


Get your prescription filled at the [facility] near the [facility].


Scripts


Clonazepam (Clonazepam) 1 Mg Tab


1 MG PO BID, #4 TAB 0 Refills


   Prov: Shima Riojas         4/14/18


Disposition:  01 DISCHARGE HOME


Condition:  Stable











Shima Riojas Apr 14, 2018 20:27